# Patient Record
Sex: MALE | Race: WHITE | NOT HISPANIC OR LATINO | Employment: OTHER | URBAN - METROPOLITAN AREA
[De-identification: names, ages, dates, MRNs, and addresses within clinical notes are randomized per-mention and may not be internally consistent; named-entity substitution may affect disease eponyms.]

---

## 2017-06-12 DIAGNOSIS — N20.0 CALCULUS OF KIDNEY: ICD-10-CM

## 2017-06-14 ENCOUNTER — HOSPITAL ENCOUNTER (OUTPATIENT)
Dept: ULTRASOUND IMAGING | Facility: MEDICAL CENTER | Age: 72
Discharge: HOME | End: 2017-06-14
Payer: COMMERCIAL

## 2017-06-14 ENCOUNTER — TRANSCRIBE ORDERS (OUTPATIENT)
Dept: ADMINISTRATIVE | Facility: HOSPITAL | Age: 72
End: 2017-06-14

## 2017-06-14 ENCOUNTER — APPOINTMENT (OUTPATIENT)
Dept: RADIOLOGY | Facility: MEDICAL CENTER | Age: 72
End: 2017-06-14
Payer: COMMERCIAL

## 2017-06-14 DIAGNOSIS — N20.0 URIC ACID NEPHROLITHIASIS: Primary | ICD-10-CM

## 2017-06-14 DIAGNOSIS — N20.0 CALCULUS OF KIDNEY: ICD-10-CM

## 2017-06-14 PROCEDURE — 76770 US EXAM ABDO BACK WALL COMP: CPT

## 2017-06-14 PROCEDURE — 74000 HB X-RAY EXAM OF ABDOMEN (SINGLE ANTEROPOSTERIOR VIEW): CPT

## 2017-06-19 ENCOUNTER — ALLSCRIPTS OFFICE VISIT (OUTPATIENT)
Dept: OTHER | Facility: OTHER | Age: 72
End: 2017-06-19

## 2017-06-19 LAB
CLARITY UR: NORMAL
COLOR UR: YELLOW
GLUCOSE (HISTORICAL): NORMAL
HGB UR QL STRIP.AUTO: NORMAL
KETONES UR STRIP-MCNC: NORMAL MG/DL
LEUKOCYTE ESTERASE UR QL STRIP: NORMAL
NITRITE UR QL STRIP: NORMAL
PH UR STRIP.AUTO: 5 [PH]
PROT UR STRIP-MCNC: NORMAL MG/DL
SP GR UR STRIP.AUTO: 1.03

## 2018-01-11 NOTE — RESULT NOTES
Verified Results  (1) PT WITH INR [id number] 07:38AM Cale Bhakta     Test Name Result Flag Reference   INR 1 03  0 86-1 16   PT 13 5 seconds  12 0-14 3

## 2018-01-11 NOTE — RESULT NOTES
Verified Results  (1) CBC/PLT/DIFF [id number] 07:38AM Krause Yakov     Test Name Result Flag Reference   WBC COUNT 6 28 Thousand/uL  4 31-10 16   RBC COUNT 4 55 Million/uL  3 88-5 62   HEMOGLOBIN 15 3 g/dL  12 0-17 0   HEMATOCRIT 43 9 %  36 5-49 3   MCV 97 fL  82-98   MCH 33 6 pg  26 8-34 3   MCHC 34 9 g/dL  31 4-37 4   RDW 13 8 %  11 6-15 1   MPV 10 9 fL  8 9-12 7   PLATELET COUNT 92 Thousands/uL L 149-390   nRBC AUTOMATED 0 /100 WBCs     NEUTROPHILS RELATIVE PERCENT 61 %  43-75   LYMPHOCYTES RELATIVE PERCENT 25 %  14-44   MONOCYTES RELATIVE PERCENT 8 %  4-12   EOSINOPHILS RELATIVE PERCENT 5 %  0-6   BASOPHILS RELATIVE PERCENT 1 %  0-1   NEUTROPHILS ABSOLUTE COUNT 3 84 Thousands/?L  1 85-7 62   LYMPHOCYTES ABSOLUTE COUNT 1 59 Thousands/?L  0 60-4 47   MONOCYTES ABSOLUTE COUNT 0 52 Thousand/?L  0 17-1 22   EOSINOPHILS ABSOLUTE COUNT 0 30 Thousand/?L  0 00-0 61   BASOPHILS ABSOLUTE COUNT 0 03 Thousands/?L  0 00-0 10     (1) APTT [id number] 07:38AM Krause Yakov     Test Name Result Flag Reference   PARTIAL THROMBOPLASTIN TIME 32 seconds  24-36   Therapeutic Heparin Range = 60-90 seconds     (1) BASIC METABOLIC PROFILE [id number] 07:38AM Krause Yakov     Test Name Result Flag Reference   GLUCOSE,RANDM 111 mg/dL     If the patient is fasting, the ADA then defines impaired fasting glucose as > 100 mg/dL and diabetes as > or equal to 123 mg/dL  SODIUM 143 mmol/L  136-145   POTASSIUM 3 8 mmol/L  3 5-5 3   CHLORIDE 105 mmol/L  100-108   CARBON DIOXIDE 28 mmol/L  21-32   ANION GAP (CALC) 10 mmol/L  4-13   BLOOD UREA NITROGEN 12 mg/dL  5-25   CREATININE 0 82 mg/dL  0 60-1 30   Standardized to IDMS reference method   CALCIUM 9 0 mg/dL  8 3-10 1   eGFR Non-African American      >60 0 ml/min/1 73sq [facility] Disease Education Program recommendations are as follows:  GFR calculation is accurate only with a steady state creatinine  Chronic Kidney disease less than 60 ml/min/1 73 sq  meters  Kidney failure less than 15 ml/min/1 73 sq  meters       (1) URINE CULTURE [id number] 07:38AM EQUISO     Test Name Result Flag Reference   CLINICAL REPORT (Report)     Test:        Urine culture  Specimen Type:   Urine  Specimen Date:   11/4/2016 7:38 AM  Result Date:    11/5/2016 2:24 PM  Result Status:   Final result  Resulting Lab:   [place] [address]            Tel: [phone]      CULTURE                                       ------------------                                   20,000-29,000 cfu/ml Mixed Contaminants X3     ECG 12-LEAD [id number] 07:30AM EQUISO     Test Name Result Flag Reference   ECG 12-LEAD      Normal sinus rhythm   Low voltage QRS   Borderline ECG   When compared with ECG of 28-SEP-2016 13:50,   No significant change was found   Confirmed by Kat Ackerman ([id number]) on 11/4/2016 4:51:15 PM

## 2018-01-12 VITALS
WEIGHT: 237 LBS | BODY MASS INDEX: 39.49 KG/M2 | HEIGHT: 65 IN | DIASTOLIC BLOOD PRESSURE: 76 MMHG | SYSTOLIC BLOOD PRESSURE: 134 MMHG | HEART RATE: 100 BPM | RESPIRATION RATE: 16 BRPM

## 2018-01-13 NOTE — RESULT NOTES
Message   Recorded as Task   Date: 08/05/2016 09:39 AM, Created By: Daksha Casarez   Task Name: Document Appointment   Assigned To: Daksha Casarez   Regarding Patient: Mague Resendiz, Status: Active   Comment:    Daksha Casarez - 05 Aug 2016 9:39 AM     TASK CREATED  Pt called to cx'l MBB proc on 08 08 2016; stated pain lvl has been @ 2 for a week and he feels really good          Signatures   Electronically signed by : Zahida Romo, ; Aug  5 2016  9:39AM EST                       (Author)

## 2018-01-15 NOTE — RESULT NOTES
Verified Results  (1) CALCULI, RENAL [id number] 04:01PM Yue Portillo     Test Name Result Flag Reference   COLOR Albert Luu     SIZE 4x4x3 mm     STONE WEIGHT 36 0 mg     COMPOSITION Comment     Percentage (Represents the % composition)   CA OXALATE,MONOHYDR  98 %     CALCIUM PHOSPHATE 02 %     NIDUS Comment     Nidus composed of Calcium oxalate monohydrate  PLEASE NOTE Comment     Calculi report with photograph will follow via computer, mail or   delivery  COMMENT-STONE3 Comment     Physician questions regarding Calculi Analysis contact [facility] at:  [phone]  PHOTO Comment     Photograph will follow under separate cover  Performed at:  [address]  [id number]  : Cr Romeo MD, Phone:  [phone]   RENAL STONE DISCLAIMER Comment     This test was developed and its performance characteristics  determined by [employer]  It has not been cleared or approved  by the Food and Drug Administration

## 2018-01-16 NOTE — RESULT NOTES
Verified Results  CT ABDOMEN PELVIS W WO CONTRAST [id number] 07:10AM Nallely Tobar Order Number: [id number]   Performing Comments: CT of the abdomen and pelvis without contrast, with contrast, with delayed images  No oral contrast required  Hematuria/urogram   - Patient Instructions: [facility] [employer] [place] 10/10/16 @@ 7:30AM   DO NOT EAT OR DRINK 3 HOURS PRIOR TO SCAN   *CLEAR LIQUIDS ONLY: WATER, APPLE JUICE, CHICKEN BROTH, CLEAR TEA, BLACK COFFEE(NO CREAMER, NO SUGAR)   PLEASE ARRIVE 15 MINUTES EARLY TO REGISTER  BRING DOCTOR SCRIPT, INSURANCE CARDS AND PHOTO ID   ALSO, PLEASE BRING A LIST OF MEDICATIONS AND ANY OVER-THE-COUNTER OR HERBAL SUPPLEMENTS YOU MAY BE CURRENTLY USING  IF YOU NEED TO RESCHEDULE YOUR APPOINTMENT, [address] [place] [address] SCHEDULING DEPT [phone] AND GIVE YOUR CONFIRMATION NUMBER  PLEASE REMEMBER TO CALL OUR OFFICE [phone] TO NOTIFY US OF ANY CHANGES TO YOUR APPOINTMENT  Test Name Result Flag Reference   CT ABDOMEN PELVIS W WO CONTRAST (Report)     CT ABDOMEN AND PELVIS WITH AND WITHOUT IV CONTRAST     INDICATION: Horseshoe kidney, right ureteral calculus status post right stent placement  Hematuria  COMPARISON: 9/28/2016     TECHNIQUE: CT of the kidneys was performed without intravenous contrast  Dynamic postcontrast CT evaluation of the abdomen and pelvis was performed in both nephrographic and delayed phases after the administration of intravenous contrast  Axial,    sagittal and coronal reformatted images were submitted for interpretation  This examination, like all CT scans performed in the [facility], was performed utilizing techniques to minimize radiation dose exposure, including the use of    iterative reconstruction and automated exposure control  100 ml of Omnipaque 350 was injected intravenously  Enteric contrast was not administered       FINDINGS:     ABDOMEN     KIDNEYS:   As seen on previous studies, a horseshoe kidney is identified  There is no calculus within the renal parenchyma  There has been interval placement of a right ureteral stent  Proximal portion curls within the right moiety pelvis, distally curls within    the urinary bladder  The previously seen perinephric inflammation and collecting system distention  Left moiety parapelvic cysts are seen and there are several subcentimeter hypodense nodules which are too small for accurate characterization  URINARY BLADDER:   No bladder wall mass  No bladder calculus is seen  Distal portion of ureteral stent curls within the anterior aspect of the urinary bladder  The right ureteral calculus is best visualized on the nonenhanced images  On series 2/92, a 3 mm calculus is seen adjacent to the stent at the level of the mid sacrum  There is no change in ureteral caliber at this level nor is there contrast    extravasation  LUNG BASES: Unremarkable  LIVER/BILIARY TREE: Heterogeneous hepatic density, with lobular margination compatible with known history of cirrhosis  There are multiple subcentimeter hypodense nodules too small for accurate characterization  As described on prior unenhanced study    there is a larger hypodense nodule along the inferior margin of the posterior right hepatic lobe measuring 18 mm in size  It remains within fluid density on delayed contrast-enhanced images favoring hepatic cyst      GALLBLADDER: Gallbladder is surgically absent  SPLEEN: Mild splenomegaly measuring almost 14 cm     PANCREAS: Unremarkable  ADRENAL GLANDS: Unremarkable  STOMACH, BOWEL AND APPENDIX: Unremarkable  ABDOMINOPELVIC CAVITY: No ascites  No free intraperitoneal air  No lymphadenopathy  VESSELS: No AAA  Collateral vessels in the upper abdomen     PELVIS     REPRODUCTIVE ORGANS: Unremarkable for patient's age  ABDOMINAL WALL/INGUINAL REGIONS: Unremarkable       OSSEOUS STRUCTURES: No acute fracture or destructive osseous lesion  IMPRESSION:       1  Status post placement of right ureteral stent with resolution of previously seen hydronephrosis of the right horseshoe kidney moiety  2  3 mm calculus adjacent to the stent at the level of the mid sacrum  3  Left moiety parapelvic cysts and several subcentimeter hypodense nodules too small to characterize  No suspicious lesion detected  4  Findings compatible with hepatic cirrhosis and portal hypertension   Previously described hypodense nodule associated with the undersurface of the right hepatic lobe demonstrates no abnormal enhancement favoring cyst  Periodic reassessment of the    liver recommended in this patient with cirrhosis/elevated risk for [address]        Workstation performed: [id number]     Signed by:   Juan Chávez MD   10/11/16

## 2018-01-17 NOTE — RESULT NOTES
Message   Recorded as Task   Date: 07/13/2016 10:12 AM, Created By: Milly Fuchs   Task Name: Follow Up   Assigned To: [id number] [address] end procedure,Team   Regarding Patient: Lobito Craig, Status: In Progress   CommentMary Boo - 13 Jul 2016 10:12 AM     TASK CREATED  Pt is S/P LT L3 TFESI #2 on 7/06/16 by Dr Mitchell  Pt has F/U appt on 7/21/16 with Bri Patton - 15 Jul 2016 1:01 PM     TASK EDITED  1st attempt to call, no answer  LMOM for CB     Dinora Adams - 18 Jul 2016 9:16 AM     TASK EDITED    S/w patient for followup after injection  -reports 30% relief   -still having problems going up steps  -otherwise, doing a lot better  -confirmed followup appt on 7/21/16   Bekah Nobles - 19 Jul 2016 12:52 PM     TASK REPLIED TO: Previously Assigned To Bekah Nobles                      aware agree with plan        Signatures   Electronically signed by : Anya Vo, ; Jul 21 2016  7:47AM EST                       (Author)

## 2018-01-18 NOTE — RESULT NOTES
Verified Results  (1) TISSUE EXAM [id number] 08:07AM Leandra Smith     Test Name Result Flag Reference   LAB AP CASE REPORT (Report)     Surgical Pathology Report             Case: [id number]                   Authorizing Provider: Alex Chambers MD  Collected:      11/09/2016 [id number]        Ordering Location:   [facility]    Received:      11/09/2016 [facility] Operating Room                            Pathologist:      Mandeep Garcia MD                                Specimen:  Ureter, Right, Right kidney stone   LAB AP FINAL DIAGNOSIS (Report)     A  Ureter, Right, Right kidney stone:   - Calculus, for gross diagnosis only  Note: The stone is sent to [address] [place] [address] for analysis; a separate report will   be issued by [address] [place] [address] following completion of their studies  Interpretation performed at [place] [address]  Electronically signed by Mandeep Garcia MD on 11/10/2016 at 4:45 PM   LAB AP SURGICAL ADDITIONAL INFORMATION (Report)     These tests were developed and their performance characteristics   determined by [facility] or [facility]  They may not be cleared or approved by the [place]  Food and   Drug Administration  The FDA has determined that such clearance or   approval is not necessary  These tests are used for clinical purposes  They should not be regarded as investigational or for research  This   laboratory has been approved by [place] [address], designated as a high-complexity   laboratory and is qualified to perform these tests  LAB AP GROSS DESCRIPTION (Report)     A  The specimen is received fresh, labeled with the patient's name and   hospital number, and is designated right kidney stone, is a dark brown   and hard calculus measuring 0 5 cm in greatest dimension  The specimen is   for gross examination only and no sections are submitted  The specimen is   sent for chemical analysis      [facility] [address], [address] [place], [place] [address]R

## 2018-06-19 ENCOUNTER — HOSPITAL ENCOUNTER (OUTPATIENT)
Dept: ULTRASOUND IMAGING | Facility: MEDICAL CENTER | Age: 73
Discharge: HOME | End: 2018-06-19
Payer: COMMERCIAL

## 2018-06-19 DIAGNOSIS — Q63.1 LOBULATED, FUSED AND HORSESHOE KIDNEY: ICD-10-CM

## 2018-06-19 PROCEDURE — 76770 US EXAM ABDO BACK WALL COMP: CPT

## 2018-07-23 ENCOUNTER — APPOINTMENT (OUTPATIENT)
Dept: RADIOLOGY | Facility: MEDICAL CENTER | Age: 73
End: 2018-07-23
Payer: COMMERCIAL

## 2018-07-23 DIAGNOSIS — Q63.1 LOBULATED, FUSED AND HORSESHOE KIDNEY: ICD-10-CM

## 2018-07-23 PROCEDURE — 74018 RADEX ABDOMEN 1 VIEW: CPT

## 2018-07-27 ENCOUNTER — OFFICE VISIT (OUTPATIENT)
Dept: UROLOGY | Facility: CLINIC | Age: 73
End: 2018-07-27
Payer: COMMERCIAL

## 2018-07-27 VITALS
SYSTOLIC BLOOD PRESSURE: 120 MMHG | DIASTOLIC BLOOD PRESSURE: 78 MMHG | BODY MASS INDEX: 39.61 KG/M2 | WEIGHT: 238 LBS | HEART RATE: 84 BPM

## 2018-07-27 DIAGNOSIS — Q63.1 HORSESHOE KIDNEY: ICD-10-CM

## 2018-07-27 DIAGNOSIS — N20.0 RIGHT KIDNEY STONE: Primary | ICD-10-CM

## 2018-07-27 PROCEDURE — 99213 OFFICE O/P EST LOW 20 MIN: CPT | Performed by: UROLOGY

## 2018-07-27 NOTE — PROGRESS NOTES
UROLOGY ROUTINE FOLLOW UP NOTE     CHIEF COMPLAINT   Stevie Rivera Sr  is a 72 y o  male with a complaint of   Chief Complaint   Patient presents with    Nephrolithiasis       History of Present Illness:     72 y o  male with a history of horseshoe kidney and stone disease  Patient underwent ureteroscopy in September and November of 2016  He has been stone free since  He is having no complaints of kidney pain or hematuria  He has not had any recurrent stone issues      Past Medical History:     Past Medical History:   Diagnosis Date    Acute right flank pain     Arthritis     generalized    Asthma     uses inhaler    Back pain     Cellulitis     [facility]idiopathic thrombocytopenia) ([facility])     Chronic kidney disease     stones    COPD (chronic obstructive pulmonary disease) (HCC)     Full dentures     GERD (gastroesophageal reflux disease)     Gout     Hearing difficulty of both ears     Hypertension     ITP (idiopathic thrombocytopenic purpura)     Lumbar disc herniation     Renal mass     Spinal stenosis of lumbar region     Tobacco abuse     Wears glasses        PAST SURGICAL HISTORY:     Past Surgical History:   Procedure Laterality Date    CARPAL TUNNEL RELEASE Bilateral     CHOLECYSTECTOMY      FINGER SURGERY Right     index finger    LUMBAR LAMINECTOMY      spinal stenosis     GA CYSTO/URETERO W/LITHOTRIPSY &INDWELL STENT INSRT Right 11/9/2016    Procedure: CYSTOSCOPY URETEROSCOPY WITH LITHOTRIPSY, RETROGRADE PYELOGRAM; BASKET STONE EXTRACTION ;  Surgeon: Karlie Moon MD;  Location: [facility];  Service: Urology    GA CYSTO/URETERO W/LITHOTRIPSY &INDWELL STENT INSRT Right 9/28/2016    Procedure: CYSTOSCOPY, URETEROSCOPY, DILATION  OF URETERAL STRICTURE, RETROGRADE PYELOGRAM, AND INSERTION STENT URETERAL;  Surgeon: Karlie Moon MD;  Location: [facility];  Service: Urology       CURRENT MEDICATIONS:     Current Outpatient Prescriptions   Medication Sig Dispense Refill    acetaminophen (TYLENOL) 325 mg tablet Take 650 mg by mouth every 6 (six) hours as needed for mild pain      amLODIPine (NORVASC) 5 mg tablet Take 5 mg by mouth daily      budesonide-formoterol (SYMBICORT) 160-4 5 mcg/act inhaler Inhale 2 puffs as needed        celecoxib (CeleBREX) 100 mg capsule Take 100 mg by mouth daily        cyanocobalamin (VITAMIN B-12) 500 mcg tablet Take 500 mcg by mouth daily   Fish Oil OIL Take 1,000 g by mouth daily        omeprazole (PRILOSEC) 20 mg delayed release capsule Take 20 mg by mouth daily        oxybutynin (DITROPAN-XL) 5 mg 24 hr tablet Take 1 tablet by mouth daily for 30 days (Patient taking differently: Take 5 mg by mouth daily Does not take any longer  LD  10/29/16 ) 30 tablet 0    tamsulosin (FLOMAX) 0 4 mg Take 1 capsule by mouth daily with dinner for 30 days 30 capsule 0     No current facility-administered medications for this visit  ALLERGIES:     Allergies   Allergen Reactions    Codeine GI Intolerance     Nausea and bleeding       SOCIAL HISTORY:     Social History     Social History    Marital status: /Civil Union     Spouse name: N/A    Number of children: N/A    Years of education: N/A     Occupational History    park host      Social History Main Topics    Smoking status: Light Tobacco Smoker     Types: Pipe    Smokeless tobacco: Never Used      Comment: pipe once a day    Alcohol use No    Drug use: No    Sexual activity: Not Asked     Other Topics Concern    None     Social History Narrative    None       SOCIAL HISTORY:   History reviewed  No pertinent family history  REVIEW OF SYSTEMS:     Review of Systems   Constitutional: Negative  Respiratory: Negative  Cardiovascular: Negative  Gastrointestinal: Negative  Genitourinary: Negative  Musculoskeletal: Negative  Skin: Negative  Neurological: Negative  Psychiatric/Behavioral: Negative      All other systems reviewed and are negative  PHYSICAL EXAM:     /78   Pulse 84   Wt 108 kg (238 lb)   BMI 39 61 kg/m²     General:  Healthy appearing male in no acute distress  They have a normal affect  There is not appear to be any gross neurologic defects or abnormalities  HEENT:  Normocephalic, atraumatic  Neck is supple without any palpable lymphadenopathy  Cardiovascular:  Patient has normal palpable distal radial pulses  There is no significant peripheral edema  No JVD is noted  Respiratory:  Patient has unlabored respirations  There is no audible wheeze or rhonchi  Abdomen:  Abdomen is obese  Abdomen is soft and nontender  There is no tympany  Inguinal and umbilical hernia are not appreciated  Genitourinary: no penile lesions or discharge, no testicular masses or tenderness, no hernias    Musculoskeletal:  Patient does not have significant CVA tenderness in the  flank with palpation or percussion  They full range of motion in all 4 extremities  Strength in all 4 extremities appears congruent  Patient is able to ambulate without assistance or difficulty  Dermatologic:  Patient has no skin abnormalities or rashes  LABS:     CBC:   Lab Results   Component Value Date    WBC 6 28 2016    HGB 15 3 2016    HCT 43 9 2016    MCV 97 2016    PLT 92 (L) 2016       BMP:   Lab Results   Component Value Date    GLUCOSE 111 2016    CALCIUM 9 0 2016     2016    K 3 8 2016    CO2 28 2016     2016    BUN 12 2016    CREATININE 0 82 2016       IMAGIN/23/18  ABDOMEN     INDICATION:   Q63 1: Lobulated, fused and horseshoe kidney      COMPARISON:  2017     VIEWS:  AP supine  Images: 3     FINDINGS:     No radiopaque renal calculi seen      No radiopaque ureteral calculi identified      Nonobstructive bowel gas pattern      No acute osseous abnormality is seen  Degenerative changes in the lumbar spine    Surgical clips in the right upper quadrant      IMPRESSION:     No radiopaque urinary tract calculi  6/16/18  RENAL ULTRASOUND     INDICATION:   Q63 1: Lobulated, fused and horseshoe kidney  History of horseshoe kidney  Annual follow-up      COMPARISON: Renal ultrasound June 14, 2017      TECHNIQUE:   Ultrasound of the retroperitoneum was performed with a curvilinear transducer utilizing volumetric sweeps and still imaging techniques       FINDINGS:  Study suboptimal due to patient body habitus      KIDNEYS:  Stable renal asymmetry  Abnormal configuration of the kidney, representing either crossed fused renal ectopia versus atypical horseshoe configuration  Right kidney:  9 6 x 4 4 cm  Left kidney:  14 0 x 5 1 cm      Right kidney  Normal echogenicity and contour  No suspicious masses detected  1 7 x 1 3 cm simple cortical cyst midpole, stable  No hydronephrosis  No shadowing calculi  No perinephric fluid collections      Left kidney  Normal echogenicity and contour  No suspicious masses detected  No hydronephrosis  No shadowing calculi  No perinephric fluid collections      URETERS:  Nonvisualized      BLADDER:   Incompletely distended  No focal thickening or mass lesions  Bilateral ureteral jets detected         IMPRESSION:  Stable sonographic appearance of the kidneys  No sonographic evidence for renal calculi  PATHOLOGY:     11/10/16  4:01 PM     COLOR  Violet Armendariz    Size mm 4x4x3    Stone Weight mg 36 0    Composition  Comment    Comment: Percentage (Represents the % composition)   Ca Oxalate,Monohydr  % 98    CALCIUM PHOSPHATE % 02    Nidus  Comment    Comment: Nidus composed of Calcium oxalate monohydrate  STONE COMMENT  Comment      ASSESSMENT:     72 y o  male with horseshoe kidney and prior stone disease    PLAN:     Patient's x-ray and ultrasound showed resolution of his stone disease  I have recommended yearly follow-up given the anatomic abnormality of his horseshoe kidney    We again discussed the stone precautions and dietary changes that can reduce stone formation in the future  Patient does still smoke a pipe from time to time and I discussed with him the risk of renal and bladder malignancy related to cigarette smoke  Should the patient ever be interested in quitting, I would be happy to discuss with him or assist in any way possible      One year follow-up with x-ray and ultrasound

## 2021-05-20 ENCOUNTER — APPOINTMENT (EMERGENCY)
Dept: RADIOLOGY | Facility: HOSPITAL | Age: 76
End: 2021-05-20
Payer: COMMERCIAL

## 2021-05-20 ENCOUNTER — HOSPITAL ENCOUNTER (EMERGENCY)
Facility: HOSPITAL | Age: 76
Discharge: HOME | End: 2021-05-20
Attending: EMERGENCY MEDICINE
Payer: COMMERCIAL

## 2021-05-20 VITALS
OXYGEN SATURATION: 93 % | WEIGHT: 245 LBS | RESPIRATION RATE: 38 BRPM | BODY MASS INDEX: 40.77 KG/M2 | TEMPERATURE: 98.5 F | DIASTOLIC BLOOD PRESSURE: 86 MMHG | HEART RATE: 109 BPM | SYSTOLIC BLOOD PRESSURE: 180 MMHG

## 2021-05-20 DIAGNOSIS — R79.89 ELEVATED SERUM CREATININE: ICD-10-CM

## 2021-05-20 DIAGNOSIS — J20.9 ACUTE BRONCHITIS: Primary | ICD-10-CM

## 2021-05-20 LAB
ALBUMIN SERPL BCP-MCNC: 3.4 G/DL (ref 3.5–5)
ALP SERPL-CCNC: 66 U/L (ref 46–116)
ALT SERPL W P-5'-P-CCNC: 49 U/L (ref 12–78)
ANION GAP SERPL CALCULATED.3IONS-SCNC: 10 MMOL/L (ref 4–13)
APTT PPP: 41 SECONDS (ref 23–37)
AST SERPL W P-5'-P-CCNC: 56 U/L (ref 5–45)
BACTERIA UR QL AUTO: NORMAL /HPF
BASOPHILS # BLD AUTO: 0.05 THOUSANDS/ΜL (ref 0–0.1)
BASOPHILS NFR BLD AUTO: 1 % (ref 0–1)
BILIRUB SERPL-MCNC: 1 MG/DL (ref 0.2–1)
BILIRUB UR QL STRIP: NEGATIVE
BUN SERPL-MCNC: 18 MG/DL (ref 5–25)
CALCIUM ALBUM COR SERPL-MCNC: 8.7 MG/DL (ref 8.3–10.1)
CALCIUM SERPL-MCNC: 8.2 MG/DL (ref 8.3–10.1)
CHLORIDE SERPL-SCNC: 103 MMOL/L (ref 100–108)
CLARITY UR: CLEAR
CO2 SERPL-SCNC: 27 MMOL/L (ref 21–32)
COLOR UR: YELLOW
CREAT SERPL-MCNC: 1.34 MG/DL (ref 0.6–1.3)
EOSINOPHIL # BLD AUTO: 0.08 THOUSAND/ΜL (ref 0–0.61)
EOSINOPHIL NFR BLD AUTO: 1 % (ref 0–6)
ERYTHROCYTE [DISTWIDTH] IN BLOOD BY AUTOMATED COUNT: 13.9 % (ref 11.6–15.1)
GFR SERPL CREATININE-BSD FRML MDRD: 52 ML/MIN/1.73SQ M
GLUCOSE SERPL-MCNC: 109 MG/DL (ref 65–140)
GLUCOSE UR STRIP-MCNC: NEGATIVE MG/DL
HCT VFR BLD AUTO: 42.7 % (ref 36.5–49.3)
HGB BLD-MCNC: 14.8 G/DL (ref 12–17)
HGB UR QL STRIP.AUTO: ABNORMAL
IMM GRANULOCYTES # BLD AUTO: 0.06 THOUSAND/UL (ref 0–0.2)
IMM GRANULOCYTES NFR BLD AUTO: 1 % (ref 0–2)
INR PPP: 1.15 (ref 0.84–1.19)
KETONES UR STRIP-MCNC: NEGATIVE MG/DL
LACTATE SERPL-SCNC: 1.6 MMOL/L (ref 0.5–2)
LEUKOCYTE ESTERASE UR QL STRIP: NEGATIVE
LYMPHOCYTES # BLD AUTO: 1.15 THOUSANDS/ΜL (ref 0.6–4.47)
LYMPHOCYTES NFR BLD AUTO: 14 % (ref 14–44)
MCH RBC QN AUTO: 33.6 PG (ref 26.8–34.3)
MCHC RBC AUTO-ENTMCNC: 34.7 G/DL (ref 31.4–37.4)
MCV RBC AUTO: 97 FL (ref 82–98)
MONOCYTES # BLD AUTO: 1.49 THOUSAND/ΜL (ref 0.17–1.22)
MONOCYTES NFR BLD AUTO: 18 % (ref 4–12)
NEUTROPHILS # BLD AUTO: 5.51 THOUSANDS/ΜL (ref 1.85–7.62)
NEUTS SEG NFR BLD AUTO: 65 % (ref 43–75)
NITRITE UR QL STRIP: NEGATIVE
NON-SQ EPI CELLS URNS QL MICRO: NORMAL /HPF
NRBC BLD AUTO-RTO: 0 /100 WBCS
NT-PROBNP SERPL-MCNC: 100 PG/ML
PH UR STRIP.AUTO: 5.5 [PH]
PLATELET # BLD AUTO: 118 THOUSANDS/UL (ref 149–390)
PMV BLD AUTO: 10.2 FL (ref 8.9–12.7)
POTASSIUM SERPL-SCNC: 4.1 MMOL/L (ref 3.5–5.3)
PROCALCITONIN SERPL-MCNC: 0.14 NG/ML
PROT SERPL-MCNC: 8.1 G/DL (ref 6.4–8.2)
PROT UR STRIP-MCNC: ABNORMAL MG/DL
PROTHROMBIN TIME: 14.7 SECONDS (ref 11.6–14.5)
RBC # BLD AUTO: 4.41 MILLION/UL (ref 3.88–5.62)
RBC #/AREA URNS AUTO: NORMAL /HPF
SARS-COV-2 RNA RESP QL NAA+PROBE: NEGATIVE
SODIUM SERPL-SCNC: 140 MMOL/L (ref 136–145)
SP GR UR STRIP.AUTO: 1.01 (ref 1–1.03)
TROPONIN I SERPL-MCNC: <0.02 NG/ML
UROBILINOGEN UR QL STRIP.AUTO: 0.2 E.U./DL
WBC # BLD AUTO: 8.34 THOUSAND/UL (ref 4.31–10.16)
WBC #/AREA URNS AUTO: NORMAL /HPF

## 2021-05-20 PROCEDURE — 81001 URINALYSIS AUTO W/SCOPE: CPT | Performed by: EMERGENCY MEDICINE

## 2021-05-20 PROCEDURE — 93005 ELECTROCARDIOGRAM TRACING: CPT

## 2021-05-20 PROCEDURE — 36415 COLL VENOUS BLD VENIPUNCTURE: CPT | Performed by: EMERGENCY MEDICINE

## 2021-05-20 PROCEDURE — 85025 COMPLETE CBC W/AUTO DIFF WBC: CPT | Performed by: EMERGENCY MEDICINE

## 2021-05-20 PROCEDURE — 99284 EMERGENCY DEPT VISIT MOD MDM: CPT | Performed by: EMERGENCY MEDICINE

## 2021-05-20 PROCEDURE — 85730 THROMBOPLASTIN TIME PARTIAL: CPT | Performed by: EMERGENCY MEDICINE

## 2021-05-20 PROCEDURE — 71045 X-RAY EXAM CHEST 1 VIEW: CPT

## 2021-05-20 PROCEDURE — U0005 INFEC AGEN DETEC AMPLI PROBE: HCPCS | Performed by: EMERGENCY MEDICINE

## 2021-05-20 PROCEDURE — 99284 EMERGENCY DEPT VISIT MOD MDM: CPT

## 2021-05-20 PROCEDURE — 83605 ASSAY OF LACTIC ACID: CPT | Performed by: EMERGENCY MEDICINE

## 2021-05-20 PROCEDURE — 83880 ASSAY OF NATRIURETIC PEPTIDE: CPT | Performed by: EMERGENCY MEDICINE

## 2021-05-20 PROCEDURE — 80053 COMPREHEN METABOLIC PANEL: CPT | Performed by: EMERGENCY MEDICINE

## 2021-05-20 PROCEDURE — U0003 INFECTIOUS AGENT DETECTION BY NUCLEIC ACID (DNA OR RNA); SEVERE ACUTE RESPIRATORY SYNDROME CORONAVIRUS 2 (SARS-COV-2) (CORONAVIRUS DISEASE [COVID-19]), AMPLIFIED PROBE TECHNIQUE, MAKING USE OF HIGH THROUGHPUT TECHNOLOGIES AS DESCRIBED BY CMS-2020-01-R: HCPCS | Performed by: EMERGENCY MEDICINE

## 2021-05-20 PROCEDURE — 85610 PROTHROMBIN TIME: CPT | Performed by: EMERGENCY MEDICINE

## 2021-05-20 PROCEDURE — 84484 ASSAY OF TROPONIN QUANT: CPT | Performed by: EMERGENCY MEDICINE

## 2021-05-20 PROCEDURE — 84145 PROCALCITONIN (PCT): CPT | Performed by: EMERGENCY MEDICINE

## 2021-05-20 PROCEDURE — 87040 BLOOD CULTURE FOR BACTERIA: CPT | Performed by: EMERGENCY MEDICINE

## 2021-05-20 RX ORDER — AMOXICILLIN 500 MG/1
500 CAPSULE ORAL 3 TIMES DAILY
Qty: 30 CAPSULE | Refills: 0 | Status: SHIPPED | OUTPATIENT
Start: 2021-05-20 | End: 2021-05-30

## 2021-05-20 RX ORDER — SODIUM CHLORIDE 9 MG/ML
3 INJECTION INTRAVENOUS
Status: DISCONTINUED | OUTPATIENT
Start: 2021-05-20 | End: 2021-05-20 | Stop reason: HOSPADM

## 2021-05-20 RX ORDER — AMOXICILLIN 250 MG/1
500 CAPSULE ORAL ONCE
Status: COMPLETED | OUTPATIENT
Start: 2021-05-20 | End: 2021-05-20

## 2021-05-20 RX ORDER — COLCHICINE 0.6 MG/1
CAPSULE ORAL EVERY 24 HOURS
COMMUNITY

## 2021-05-20 RX ADMIN — AMOXICILLIN 500 MG: 250 CAPSULE ORAL at 19:10

## 2021-05-20 NOTE — ED PROVIDER NOTES
History  Chief Complaint   Patient presents with    Fever - 9 weeks to 74 years     To ED with c/o fever, chills and cough since Monday  Notes decreased urinary output  Denies any pain     Patient with PMH horseshoe kidney, asthma, ITP, CKD, HTN, COPD referred over from PCP with temp 100 1, pain in left kidney and decreased breath sounds left kidney  Patient has had sob fever chills dry cough for about 4 days now  He has been taking Tylenol PM without relief  He already received second covid-19 vaccine about 3 weeks ago  Prior to Admission Medications   Prescriptions Last Dose Informant Patient Reported? Taking? Colchicine 0 6 MG CAPS   Yes No   Sig: every 24 hours   Fish Oil OIL   Yes No   Sig: Take 1,000 g by mouth daily     acetaminophen (TYLENOL) 325 mg tablet   Yes No   Sig: Take 650 mg by mouth every 6 (six) hours as needed for mild pain   amLODIPine (NORVASC) 5 mg tablet   Yes No   Sig: Take 5 mg by mouth daily   budesonide-formoterol (SYMBICORT) 160-4 5 mcg/act inhaler   Yes No   Sig: Inhale 2 puffs as needed     celecoxib (CeleBREX) 100 mg capsule   Yes No   Sig: Take 100 mg by mouth daily     cyanocobalamin (VITAMIN B-12) 500 mcg tablet   Yes No   Sig: Take 500 mcg by mouth daily  omeprazole (PRILOSEC) 20 mg delayed release capsule   Yes No   Sig: Take 20 mg by mouth daily     oxybutynin (DITROPAN-XL) 5 mg 24 hr tablet  Self No No   Sig: Take 1 tablet by mouth daily for 30 days   Patient taking differently: Take 5 mg by mouth daily Does not take any longer    LD  10/29/16    tamsulosin (FLOMAX) 0 4 mg   No No   Sig: Take 1 capsule by mouth daily with dinner for 30 days      Facility-Administered Medications: None       Past Medical History:   Diagnosis Date    Acute right flank pain     Arthritis     generalized    Asthma     uses inhaler    Back pain     Cellulitis     Chronic ITP (idiopathic thrombocytopenia) (HCC)     Chronic kidney disease     stones    COPD [facility]chronic obstructive pulmonary disease) ([facility] [address] )     Full dentures     GERD (gastroesophageal reflux disease)     Gout     Hearing difficulty of both ears     Hypertension     ITP (idiopathic thrombocytopenic purpura)     Lumbar disc herniation     Renal mass     Spinal stenosis of lumbar region     Tobacco abuse     Wears glasses        Past Surgical History:   Procedure Laterality Date    CARPAL TUNNEL RELEASE Bilateral     CHOLECYSTECTOMY      FINGER SURGERY Right     index finger    LUMBAR LAMINECTOMY      spinal stenosis     RI CYSTO/URETERO W/LITHOTRIPSY &INDWELL STENT INSRT Right 11/9/2016    Procedure: CYSTOSCOPY URETEROSCOPY WITH LITHOTRIPSY, RETROGRADE PYELOGRAM; BASKET STONE EXTRACTION ;  Surgeon: Keyona Vega MD;  Location: [facility];  Service: Urology    RI CYSTO/URETERO W/LITHOTRIPSY &INDWELL STENT INSRT Right 9/28/2016    Procedure: CYSTOSCOPY, URETEROSCOPY, DILATION  OF URETERAL STRICTURE, RETROGRADE PYELOGRAM, AND INSERTION STENT URETERAL;  Surgeon: Keyona Vega MD;  Location: [facility];  Service: Urology       History reviewed  No pertinent family history  I have reviewed and agree with the history as documented  E-Cigarette/Vaping    E-Cigarette Use Never User      E-Cigarette/Vaping Substances    Nicotine No     Flavoring No      Social History     Tobacco Use    Smoking status: Light Tobacco Smoker     Types: Pipe    Smokeless tobacco: Never Used    Tobacco comment: pipe once a day   Substance Use Topics    Alcohol use: No    Drug use: No       Review of Systems   Constitutional: Positive for chills and fever  HENT: Negative for rhinorrhea and sore throat  Respiratory: Positive for cough and shortness of breath  Cardiovascular: Negative for chest pain  Gastrointestinal: Negative for constipation, diarrhea, nausea and vomiting  Genitourinary: Negative for dysuria and frequency  Musculoskeletal: Positive for back pain  Skin: Negative for rash     All other systems reviewed and are negative  Physical Exam  Physical Exam  Vitals signs and nursing note reviewed  Constitutional:       Appearance: He is well-developed  HENT:      Head: Normocephalic and atraumatic  Right Ear: External ear normal       Left Ear: External ear normal       Nose: Nose normal    Eyes:      Conjunctiva/sclera: Conjunctivae normal       Pupils: Pupils are equal, round, and reactive to light  Neck:      Musculoskeletal: Normal range of motion and neck supple  No spinous process tenderness  Cardiovascular:      Rate and Rhythm: Normal rate and regular rhythm  Heart sounds: Normal heart sounds  Pulmonary:      Effort: Pulmonary effort is normal  Tachypnea present  No respiratory distress  Breath sounds: Normal breath sounds  No wheezing  Abdominal:      General: Bowel sounds are normal  There is no distension  Palpations: Abdomen is soft  Tenderness: There is no abdominal tenderness  There is no right CVA tenderness or left CVA tenderness  Musculoskeletal: Normal range of motion  General: No deformity  Skin:     General: Skin is warm and dry  Findings: No rash  Neurological:      General: No focal deficit present  Mental Status: He is alert and oriented to person, place, and time  GCS: GCS eye subscore is 4  GCS verbal subscore is 5  GCS motor subscore is 6  Sensory: No sensory deficit     Psychiatric:         Mood and Affect: Mood normal          Vital Signs  ED Triage Vitals   Temperature Pulse Respirations Blood Pressure SpO2   05/20/21 1718 05/20/21 1715 05/20/21 1715 05/20/21 1715 05/20/21 1715   98 5 °F (36 9 °C) 105 (!) 35 129/71 94 %      Temp Source Heart Rate Source Patient Position - Orthostatic VS BP Location FiO2 (%)   05/20/21 1718 05/20/21 1718 05/20/21 1718 05/20/21 1718 --   Tympanic Monitor Sitting Right arm       Pain Score       --                  Vitals:    05/20/21 1815 05/20/21 1830 05/20/21 1845 05/20/21 1900   BP: 145/79 159/87  (!) 180/86   Pulse: (!) 107 (!) 109 (!) 107 (!) 109   Patient Position - Orthostatic VS:             Visual Acuity      ED Medications  Medications   amoxicillin (AMOXIL) capsule 500 mg (500 mg Oral Given 5/20/21 1910)       Diagnostic Studies  Results Reviewed     Procedure Component Value Units Date/Time    Novel Coronavirus [facility] [[id number]]  (Normal) Collected: 05/20/21 1905    Lab Status: Final result Specimen: Nares from Nose Updated: 05/20/21 2028     SARS-CoV-2 Negative    Narrative: The specimen collection materials, transport medium, and/or testing methodology utilized in the production of these test results have been proven to be reliable in a limited validation with an abbreviated program under the Emergency Utilization Authorization provided by the FDA  Testing reported as "Presumptive positive" will be confirmed with secondary testing to ensure result accuracy  Clinical caution and judgement should be used with the interpretation of these results with consideration of the clinical impression and other laboratory testing  Testing reported as "Positive" or "Negative" has been proven to be accurate according to standard laboratory validation requirements  All testing is performed with control materials showing appropriate reactivity at standard intervals        Urine Microscopic [[id number]]  (Normal) Collected: 05/20/21 1816    Lab Status: Final result Specimen: Urine, Clean Catch Updated: 05/20/21 1942     RBC, UA 1-2 /hpf      WBC, UA None Seen /hpf      Epithelial Cells None Seen /hpf      Bacteria, UA None Seen /hpf     UA w Reflex to Microscopic w Reflex to Culture [[id number]]  (Abnormal) Collected: 05/20/21 1816    Lab Status: Final result Specimen: Urine, Clean Catch Updated: 05/20/21 1826     Color, UA Yellow     Clarity, UA Clear     Specific Gravity, UA 1 015     pH, UA 5 5     Leukocytes, UA Negative     Nitrite, UA Negative     Protein, UA 30 (1+) mg/dl      Glucose, UA Negative mg/dl      Ketones, UA Negative mg/dl      Urobilinogen, UA 0 2 E U /dl      Bilirubin, UA Negative     Blood, UA Small    Protime-INR [[id number]]  (Abnormal) Collected: 05/20/21 1730    Lab Status: Final result Specimen: Blood from Arm, Right Updated: 05/20/21 1817     Protime 14 7 seconds      INR 1 15    Narrative:      No clots    APTT [[id number]]  (Abnormal) Collected: 05/20/21 1730    Lab Status: Final result Specimen: Blood from Arm, Right Updated: 05/20/21 1817     PTT 41 seconds     NT-BNP PRO [[id number]]  (Normal) Collected: 05/20/21 1730    Lab Status: Final result Specimen: Blood from Arm, Right Updated: 05/20/21 1810     NT-proBNP 100 pg/mL     Lactic Acid [[id number]]  (Normal) Collected: 05/20/21 1730    Lab Status: Final result Specimen: Blood from Arm, Right Updated: 05/20/21 1805     LACTIC ACID 1 6 mmol/L     Narrative:      Result may be elevated if tourniquet was used during collection      Troponin I [[id number]]  (Normal) Collected: 05/20/21 1730    Lab Status: Final result Specimen: Blood from Arm, Right Updated: 05/20/21 1805     Troponin I <0 02 ng/mL     Comprehensive metabolic panel [[id number]]  (Abnormal) Collected: 05/20/21 1730    Lab Status: Final result Specimen: Blood from Arm, Right Updated: 05/20/21 1803     Sodium 140 mmol/L      Potassium 4 1 mmol/L      Chloride 103 mmol/L      CO2 27 mmol/L      ANION GAP 10 mmol/L      BUN 18 mg/dL      Creatinine 1 34 mg/dL      Glucose 109 mg/dL      Calcium 8 2 mg/dL      Corrected Calcium 8 7 mg/dL      AST 56 U/L      ALT 49 U/L      Alkaline Phosphatase 66 U/L      Total Protein 8 1 g/dL      Albumin 3 4 g/dL      Total Bilirubin 1 00 mg/dL      eGFR 52 ml/min/1 73sq m     Narrative:      [facility] guidelines for Chronic Kidney Disease (CKD):     Stage 1 with normal or high GFR (GFR > 90 mL/min/1 73 square meters)    Stage 2 Mild CKD (GFR = 60-89 mL/min/1 73 square meters)   Stage 3A Moderate CKD (GFR = 45-59 mL/min/1 73 square meters)    Stage 3B Moderate CKD (GFR = 30-44 mL/min/1 73 square meters)    Stage 4 Severe CKD (GFR = 15-29 mL/min/1 73 square meters)    Stage 5 End Stage CKD (GFR <15 mL/min/1 73 square meters)  Note: GFR calculation is accurate only with a steady state creatinine    CBC and differential [[id number]]  (Abnormal) Collected: 05/20/21 1730    Lab Status: Final result Specimen: Blood from Arm, Right Updated: 05/20/21 1757     WBC 8 34 Thousand/uL      RBC 4 41 Million/uL      Hemoglobin 14 8 g/dL      Hematocrit 42 7 %      MCV 97 fL      MCH 33 6 pg      MCHC 34 7 g/dL      RDW 13 9 %      MPV 10 2 fL      Platelets 489 Thousands/uL      nRBC 0 /100 WBCs      Neutrophils Relative 65 %      Immat GRANS % 1 %      Lymphocytes Relative 14 %      Monocytes Relative 18 %      Eosinophils Relative 1 %      Basophils Relative 1 %      Neutrophils Absolute 5 51 Thousands/µL      Immature Grans Absolute 0 06 Thousand/uL      Lymphocytes Absolute 1 15 Thousands/µL      Monocytes Absolute 1 49 Thousand/µL      Eosinophils Absolute 0 08 Thousand/µL      Basophils Absolute 0 05 Thousands/µL     Procalcitonin with AM Reflex [[id number]] Collected: 05/20/21 1730    Lab Status: In process Specimen: Blood from Arm, Right Updated: 05/20/21 1738    Blood culture #2 [[id number]] Collected: 05/20/21 1720    Lab Status: In process Specimen: Blood from Arm, Right Updated: 05/20/21 1737    Blood culture #1 [[id number]] Collected: 05/20/21 1730    Lab Status:  In process Specimen: Blood from Arm, Right Updated: 05/20/21 1737                 XR chest 1 view portable   ED Interpretation by Britney Hua DO (05/20 1752)   No acute abnl                 Procedures  ECG 12 Lead Documentation Only    Date/Time: 5/20/2021 8:20 PM  Performed by: Britney Hua DO  Authorized by: Britney uHa DO     Indications / Diagnosis:  Sob  ECG reviewed by me, the ED Provider: yes    Patient location:  ED  Previous ECG:     Previous ECG:  Unavailable  Interpretation:     Interpretation: non-specific    Rate:     ECG rate:  103    ECG rate assessment: tachycardic    Rhythm:     Rhythm: sinus tachycardia    Ectopy:     Ectopy: none    QRS:     QRS axis:  Normal    QRS intervals:  Normal  Conduction:     Conduction: normal    ST segments:     ST segments:  Normal  T waves:     T waves: normal    Q waves:     Q waves:  III and aVF             ED Course                             SBIRT 22yo+      Most Recent Value   SBIRT (24 yo +)   In order to provide better care to our patients, we are screening all of our patients for alcohol and drug use  Would it be okay to ask you these screening questions? Yes Filed at: 05/20/2021 1732   Initial Alcohol Screen: US AUDIT-C    1  How often do you have a drink containing alcohol? 2 Filed at: 05/20/2021 1732   2  How many drinks containing alcohol do you have on a typical day you are drinking? 1 Filed at: 05/20/2021 1732   3a  Male UNDER 65: How often do you have five or more drinks on one occasion? 0 Filed at: 05/20/2021 1732   3b  FEMALE Any Age, or MALE 65+: How often do you have 4 or more drinks on one occassion? 0 Filed at: 05/20/2021 1732   Audit-C Score  3 Filed at: 05/20/2021 1732   KARINA: How many times in the past year have you    Used an illegal drug or used a prescription medication for non-medical reasons?   Never Filed at: 05/20/2021 1732                    MDM  Number of Diagnoses or Management Options  Acute bronchitis: new and requires workup  Elevated serum creatinine: new and requires workup     Amount and/or Complexity of Data Reviewed  Clinical lab tests: ordered and reviewed  Tests in the radiology section of CPT®: ordered and reviewed  Obtain history from someone other than the patient: yes    Patient Progress  Patient progress: improved      Disposition  Final diagnoses:   Acute bronchitis   Elevated serum creatinine     Time reflects when diagnosis was documented in both MDM as applicable and the Disposition within this note     Time User Action Codes Description Comment    5/20/2021  7:03 PM Jani Pacheco Add [J20 9] Acute bronchitis     5/20/2021  7:03 PM Jani Pacheco Add [R79 89] Elevated serum creatinine       ED Disposition     ED Disposition Condition Date/Time Comment    Discharge Stable Thu May 20, 2021  7:03 PM Fariba Baxter Sr  discharge to home/self care  Follow-up Information    None         Discharge Medication List as of 5/20/2021  7:04 PM      START taking these medications    Details   amoxicillin (AMOXIL) 500 mg capsule Take 1 capsule (500 mg total) by mouth 3 (three) times a day for 10 days, Starting Thu 5/20/2021, Until Sun 5/30/2021, Normal         CONTINUE these medications which have NOT CHANGED    Details   acetaminophen (TYLENOL) 325 mg tablet Take 650 mg by mouth every 6 (six) hours as needed for mild pain, Historical Med      amLODIPine (NORVASC) 5 mg tablet Take 5 mg by mouth daily, Historical Med      budesonide-formoterol (SYMBICORT) 160-4 5 mcg/act inhaler Inhale 2 puffs as needed  , Historical Med      celecoxib (CeleBREX) 100 mg capsule Take 100 mg by mouth daily  , Historical Med      Colchicine 0 6 MG CAPS every 24 hours, Historical Med      cyanocobalamin (VITAMIN B-12) 500 mcg tablet Take 500 mcg by mouth daily  , Historical Med      Fish Oil OIL Take 1,000 g by mouth daily  , Historical Med      omeprazole (PRILOSEC) 20 mg delayed release capsule Take 20 mg by mouth daily  , Starting Sat 2/19/2011, Historical Med      oxybutynin (DITROPAN-XL) 5 mg 24 hr tablet Take 1 tablet by mouth daily for 30 days, Starting 9/29/2016, Until Sat 10/29/16, Print      tamsulosin (FLOMAX) 0 4 mg Take 1 capsule by mouth daily with dinner for 30 days, Starting 11/9/2016, Until Fri 12/9/16, Print           No discharge procedures on file      PDMP Review     None          ED Provider  Electronically Signed by           Mallika Wahl DO  05/20/21 1244

## 2021-05-22 LAB
ATRIAL RATE: 103 BPM
P AXIS: 81 DEGREES
PR INTERVAL: 158 MS
QRS AXIS: -27 DEGREES
QRSD INTERVAL: 78 MS
QT INTERVAL: 302 MS
QTC INTERVAL: 395 MS
T WAVE AXIS: 73 DEGREES
VENTRICULAR RATE: 103 BPM

## 2021-05-22 PROCEDURE — 93010 ELECTROCARDIOGRAM REPORT: CPT | Performed by: INTERNAL MEDICINE

## 2021-05-25 LAB
BACTERIA BLD CULT: NORMAL
BACTERIA BLD CULT: NORMAL

## 2021-06-04 ENCOUNTER — TRANSCRIBE ORDERS (OUTPATIENT)
Dept: ADMINISTRATIVE | Facility: HOSPITAL | Age: 76
End: 2021-06-04

## 2021-06-04 DIAGNOSIS — R29.898 OTHER SYMPTOMS AND SIGNS INVOLVING THE MUSCULOSKELETAL SYSTEM: Primary | ICD-10-CM

## 2021-06-07 ENCOUNTER — TRANSCRIBE ORDERS (OUTPATIENT)
Dept: ADMINISTRATIVE | Facility: HOSPITAL | Age: 76
End: 2021-06-07

## 2021-06-07 ENCOUNTER — HOSPITAL ENCOUNTER (OUTPATIENT)
Dept: RADIOLOGY | Facility: HOSPITAL | Age: 76
Discharge: HOME | End: 2021-06-07
Payer: COMMERCIAL

## 2021-06-07 DIAGNOSIS — R05.9 COUGH: ICD-10-CM

## 2021-06-07 DIAGNOSIS — R05.9 COUGH: Primary | ICD-10-CM

## 2021-06-07 PROCEDURE — 71046 X-RAY EXAM CHEST 2 VIEWS: CPT

## 2021-06-10 ENCOUNTER — HOSPITAL ENCOUNTER (OUTPATIENT)
Dept: CT IMAGING | Facility: HOSPITAL | Age: 76
Discharge: HOME | End: 2021-06-10
Payer: COMMERCIAL

## 2021-06-10 DIAGNOSIS — R29.898 OTHER SYMPTOMS AND SIGNS INVOLVING THE MUSCULOSKELETAL SYSTEM: ICD-10-CM

## 2021-06-10 PROCEDURE — 70450 CT HEAD/BRAIN W/O DYE: CPT

## 2023-08-28 ENCOUNTER — HOSPITAL ENCOUNTER (INPATIENT)
Facility: HOSPITAL | Age: 78
LOS: 3 days | Discharge: HOME | DRG: 821 | End: 2023-08-31
Attending: EMERGENCY MEDICINE | Admitting: INTERNAL MEDICINE
Payer: COMMERCIAL

## 2023-08-28 ENCOUNTER — APPOINTMENT (EMERGENCY)
Dept: CT IMAGING | Facility: HOSPITAL | Age: 78
DRG: 821 | End: 2023-08-28
Payer: COMMERCIAL

## 2023-08-28 DIAGNOSIS — R18.8 CIRRHOSIS OF LIVER WITH ASCITES, UNSPECIFIED HEPATIC CIRRHOSIS TYPE (HCC): ICD-10-CM

## 2023-08-28 DIAGNOSIS — K76.9 HEPATIC LESION: ICD-10-CM

## 2023-08-28 DIAGNOSIS — R18.8 ASCITES: Primary | ICD-10-CM

## 2023-08-28 DIAGNOSIS — C79.9 METASTATIC CANCER (HCC): ICD-10-CM

## 2023-08-28 DIAGNOSIS — K74.60 CIRRHOSIS OF LIVER WITH ASCITES, UNSPECIFIED HEPATIC CIRRHOSIS TYPE (HCC): ICD-10-CM

## 2023-08-28 DIAGNOSIS — R93.5 ABNORMAL CT OF THE ABDOMEN: ICD-10-CM

## 2023-08-28 DIAGNOSIS — C85.90 LYMPHOMA (HCC): ICD-10-CM

## 2023-08-28 DIAGNOSIS — C85.10 LARGE B-CELL LYMPHOMA (HCC): ICD-10-CM

## 2023-08-28 DIAGNOSIS — E83.52 HYPERCALCEMIA: ICD-10-CM

## 2023-08-28 PROBLEM — M54.9 BACK PAIN: Status: ACTIVE | Noted: 2023-08-28

## 2023-08-28 PROBLEM — R10.9 ABDOMINAL PAIN: Status: ACTIVE | Noted: 2023-08-28

## 2023-08-28 PROBLEM — D69.3 CHRONIC ITP (IDIOPATHIC THROMBOCYTOPENIA) (HCC): Status: ACTIVE | Noted: 2023-08-28

## 2023-08-28 PROBLEM — D75.89 MACROCYTOSIS: Status: ACTIVE | Noted: 2023-08-28

## 2023-08-28 LAB
2HR DELTA HS TROPONIN: -1 NG/L
ALBUMIN SERPL BCP-MCNC: 3 G/DL (ref 3.5–5)
ALP SERPL-CCNC: 125 U/L (ref 34–104)
ALT SERPL W P-5'-P-CCNC: 37 U/L (ref 7–52)
ANION GAP SERPL CALCULATED.3IONS-SCNC: 12 MMOL/L
AST SERPL W P-5'-P-CCNC: 66 U/L (ref 13–39)
ATRIAL RATE: 87 BPM
ATRIAL RATE: 90 BPM
BASOPHILS # BLD AUTO: 0.05 THOUSANDS/ÂΜL (ref 0–0.1)
BASOPHILS NFR BLD AUTO: 1 % (ref 0–1)
BILIRUB SERPL-MCNC: 1.13 MG/DL (ref 0.2–1)
BILIRUB UR QL STRIP: NEGATIVE
BUN SERPL-MCNC: 22 MG/DL (ref 5–25)
CALCIUM ALBUM COR SERPL-MCNC: 13.4 MG/DL (ref 8.3–10.1)
CALCIUM SERPL-MCNC: 12.6 MG/DL (ref 8.4–10.2)
CARDIAC TROPONIN I PNL SERPL HS: 6 NG/L
CARDIAC TROPONIN I PNL SERPL HS: 7 NG/L
CHLORIDE SERPL-SCNC: 103 MMOL/L (ref 96–108)
CLARITY UR: CLEAR
CO2 SERPL-SCNC: 24 MMOL/L (ref 21–32)
COLOR UR: ABNORMAL
CREAT SERPL-MCNC: 1.31 MG/DL (ref 0.6–1.3)
EOSINOPHIL # BLD AUTO: 0.22 THOUSAND/ÂΜL (ref 0–0.61)
EOSINOPHIL NFR BLD AUTO: 3 % (ref 0–6)
ERYTHROCYTE [DISTWIDTH] IN BLOOD BY AUTOMATED COUNT: 15 % (ref 11.6–15.1)
GFR SERPL CREATININE-BSD FRML MDRD: 52 ML/MIN/1.73SQ M
GLUCOSE SERPL-MCNC: 100 MG/DL (ref 65–140)
GLUCOSE UR STRIP-MCNC: NEGATIVE MG/DL
HCT VFR BLD AUTO: 42.6 % (ref 36.5–49.3)
HGB BLD-MCNC: 13.9 G/DL (ref 12–17)
HGB UR QL STRIP.AUTO: NEGATIVE
IMM GRANULOCYTES # BLD AUTO: 0.04 THOUSAND/UL (ref 0–0.2)
IMM GRANULOCYTES NFR BLD AUTO: 1 % (ref 0–2)
KETONES UR STRIP-MCNC: NEGATIVE MG/DL
LEUKOCYTE ESTERASE UR QL STRIP: NEGATIVE
LYMPHOCYTES # BLD AUTO: 1.3 THOUSANDS/ÂΜL (ref 0.6–4.47)
LYMPHOCYTES NFR BLD AUTO: 15 % (ref 14–44)
MCH RBC QN AUTO: 32.7 PG (ref 26.8–34.3)
MCHC RBC AUTO-ENTMCNC: 32.6 G/DL (ref 31.4–37.4)
MCV RBC AUTO: 100 FL (ref 82–98)
MONOCYTES # BLD AUTO: 0.9 THOUSAND/ÂΜL (ref 0.17–1.22)
MONOCYTES NFR BLD AUTO: 10 % (ref 4–12)
NEUTROPHILS # BLD AUTO: 6.35 THOUSANDS/ÂΜL (ref 1.85–7.62)
NEUTS SEG NFR BLD AUTO: 70 % (ref 43–75)
NITRITE UR QL STRIP: NEGATIVE
NRBC BLD AUTO-RTO: 0 /100 WBCS
P AXIS: 44 DEGREES
P AXIS: 47 DEGREES
PH UR STRIP.AUTO: 6 [PH]
PLATELET # BLD AUTO: 196 THOUSANDS/UL (ref 149–390)
PMV BLD AUTO: 10 FL (ref 8.9–12.7)
POTASSIUM SERPL-SCNC: 4.3 MMOL/L (ref 3.5–5.3)
PR INTERVAL: 164 MS
PR INTERVAL: 178 MS
PROT SERPL-MCNC: 6.4 G/DL (ref 6.4–8.4)
PROT UR STRIP-MCNC: NEGATIVE MG/DL
QRS AXIS: -11 DEGREES
QRS AXIS: 0 DEGREES
QRSD INTERVAL: 86 MS
QRSD INTERVAL: 88 MS
QT INTERVAL: 334 MS
QT INTERVAL: 344 MS
QTC INTERVAL: 401 MS
QTC INTERVAL: 411 MS
RBC # BLD AUTO: 4.25 MILLION/UL (ref 3.88–5.62)
SODIUM SERPL-SCNC: 139 MMOL/L (ref 135–147)
SP GR UR STRIP.AUTO: >=1.05 (ref 1–1.03)
T WAVE AXIS: -1 DEGREES
T WAVE AXIS: 8 DEGREES
UROBILINOGEN UR STRIP-ACNC: <2 MG/DL
VENTRICULAR RATE: 86 BPM
VENTRICULAR RATE: 87 BPM
WBC # BLD AUTO: 8.86 THOUSAND/UL (ref 4.31–10.16)

## 2023-08-28 PROCEDURE — 36415 COLL VENOUS BLD VENIPUNCTURE: CPT | Performed by: EMERGENCY MEDICINE

## 2023-08-28 PROCEDURE — G1004 CDSM NDSC: HCPCS

## 2023-08-28 PROCEDURE — 85025 COMPLETE CBC W/AUTO DIFF WBC: CPT | Performed by: EMERGENCY MEDICINE

## 2023-08-28 PROCEDURE — 0DB68ZX EXCISION OF STOMACH, VIA NATURAL OR ARTIFICIAL OPENING ENDOSCOPIC, DIAGNOSTIC: ICD-10-PCS | Performed by: INTERNAL MEDICINE

## 2023-08-28 PROCEDURE — 71260 CT THORAX DX C+: CPT

## 2023-08-28 PROCEDURE — 99285 EMERGENCY DEPT VISIT HI MDM: CPT

## 2023-08-28 PROCEDURE — 74177 CT ABD & PELVIS W/CONTRAST: CPT

## 2023-08-28 PROCEDURE — 99285 EMERGENCY DEPT VISIT HI MDM: CPT | Performed by: EMERGENCY MEDICINE

## 2023-08-28 PROCEDURE — 93010 ELECTROCARDIOGRAM REPORT: CPT | Performed by: STUDENT IN AN ORGANIZED HEALTH CARE EDUCATION/TRAINING PROGRAM

## 2023-08-28 PROCEDURE — 96361 HYDRATE IV INFUSION ADD-ON: CPT

## 2023-08-28 PROCEDURE — 96360 HYDRATION IV INFUSION INIT: CPT

## 2023-08-28 PROCEDURE — 93005 ELECTROCARDIOGRAM TRACING: CPT

## 2023-08-28 PROCEDURE — 0DB78ZX EXCISION OF STOMACH, PYLORUS, VIA NATURAL OR ARTIFICIAL OPENING ENDOSCOPIC, DIAGNOSTIC: ICD-10-PCS | Performed by: INTERNAL MEDICINE

## 2023-08-28 PROCEDURE — 84484 ASSAY OF TROPONIN QUANT: CPT | Performed by: EMERGENCY MEDICINE

## 2023-08-28 PROCEDURE — 99255 IP/OBS CONSLTJ NEW/EST HI 80: CPT | Performed by: NURSE PRACTITIONER

## 2023-08-28 PROCEDURE — 99223 1ST HOSP IP/OBS HIGH 75: CPT | Performed by: INTERNAL MEDICINE

## 2023-08-28 PROCEDURE — 80053 COMPREHEN METABOLIC PANEL: CPT | Performed by: EMERGENCY MEDICINE

## 2023-08-28 PROCEDURE — 81003 URINALYSIS AUTO W/O SCOPE: CPT | Performed by: EMERGENCY MEDICINE

## 2023-08-28 RX ORDER — PANTOPRAZOLE SODIUM 40 MG/1
40 TABLET, DELAYED RELEASE ORAL
Status: DISCONTINUED | OUTPATIENT
Start: 2023-08-29 | End: 2023-08-29

## 2023-08-28 RX ORDER — ALLOPURINOL 100 MG/1
100 TABLET ORAL DAILY
Status: DISCONTINUED | OUTPATIENT
Start: 2023-08-28 | End: 2023-08-31 | Stop reason: HOSPADM

## 2023-08-28 RX ORDER — DOCUSATE SODIUM 100 MG/1
100 CAPSULE, LIQUID FILLED ORAL 2 TIMES DAILY
Status: DISCONTINUED | OUTPATIENT
Start: 2023-08-28 | End: 2023-08-31 | Stop reason: HOSPADM

## 2023-08-28 RX ORDER — TRAMADOL HYDROCHLORIDE 50 MG/1
50 TABLET ORAL
COMMUNITY
Start: 2023-04-21

## 2023-08-28 RX ORDER — TRAMADOL HYDROCHLORIDE 50 MG/1
50 TABLET ORAL EVERY 6 HOURS PRN
Status: DISCONTINUED | OUTPATIENT
Start: 2023-08-28 | End: 2023-08-31 | Stop reason: HOSPADM

## 2023-08-28 RX ORDER — FAMOTIDINE 20 MG/1
20 TABLET, FILM COATED ORAL
COMMUNITY

## 2023-08-28 RX ORDER — ALLOPURINOL 100 MG/1
100 TABLET ORAL DAILY
COMMUNITY

## 2023-08-28 RX ORDER — SODIUM CHLORIDE 9 MG/ML
100 INJECTION, SOLUTION INTRAVENOUS CONTINUOUS
Status: DISCONTINUED | OUTPATIENT
Start: 2023-08-28 | End: 2023-08-29

## 2023-08-28 RX ORDER — FAMOTIDINE 20 MG/1
20 TABLET, FILM COATED ORAL
Status: DISCONTINUED | OUTPATIENT
Start: 2023-08-28 | End: 2023-08-31 | Stop reason: HOSPADM

## 2023-08-28 RX ORDER — BUDESONIDE AND FORMOTEROL FUMARATE DIHYDRATE 160; 4.5 UG/1; UG/1
2 AEROSOL RESPIRATORY (INHALATION) 2 TIMES DAILY
Status: DISCONTINUED | OUTPATIENT
Start: 2023-08-28 | End: 2023-08-31 | Stop reason: HOSPADM

## 2023-08-28 RX ORDER — ONDANSETRON 2 MG/ML
4 INJECTION INTRAMUSCULAR; INTRAVENOUS EVERY 6 HOURS PRN
Status: DISCONTINUED | OUTPATIENT
Start: 2023-08-28 | End: 2023-08-31 | Stop reason: HOSPADM

## 2023-08-28 RX ORDER — HEPARIN SODIUM 5000 [USP'U]/ML
5000 INJECTION, SOLUTION INTRAVENOUS; SUBCUTANEOUS EVERY 8 HOURS SCHEDULED
Status: DISCONTINUED | OUTPATIENT
Start: 2023-08-28 | End: 2023-08-31 | Stop reason: HOSPADM

## 2023-08-28 RX ADMIN — HEPARIN SODIUM 5000 UNITS: 5000 INJECTION INTRAVENOUS; SUBCUTANEOUS at 21:49

## 2023-08-28 RX ADMIN — FAMOTIDINE 20 MG: 20 TABLET ORAL at 21:49

## 2023-08-28 RX ADMIN — SODIUM CHLORIDE 100 ML/HR: 0.9 INJECTION, SOLUTION INTRAVENOUS at 15:41

## 2023-08-28 RX ADMIN — IOHEXOL 100 ML: 350 INJECTION, SOLUTION INTRAVENOUS at 12:11

## 2023-08-28 RX ADMIN — BUDESONIDE AND FORMOTEROL FUMARATE DIHYDRATE 2 PUFF: 160; 4.5 AEROSOL RESPIRATORY (INHALATION) at 21:49

## 2023-08-28 RX ADMIN — SODIUM CHLORIDE 1000 ML: 0.9 INJECTION, SOLUTION INTRAVENOUS at 12:28

## 2023-08-28 RX ADMIN — HEPARIN SODIUM 5000 UNITS: 5000 INJECTION INTRAVENOUS; SUBCUTANEOUS at 15:39

## 2023-08-28 NOTE — ASSESSMENT & PLAN NOTE
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

## 2023-08-28 NOTE — ASSESSMENT & PLAN NOTE
· Patient's corrected calcium is 13.4:  C/w moderate hypercalcemia  · Most likely hypercalcemia of malignancy  · Pt is asymptomatic. Suspect this is not acute hypercalcemia due to malignancy that has progressed  · start IV fluids with isotonic saline and follow up calcium levels:   If not improved will order bisphosphonate  · Check ionized calcium  · EKG wnl  · No evidence of hyperreflexia, or symptoms

## 2023-08-28 NOTE — CONSULTS
Medical Oncology/Hematology Consult Note  Shalonda Pruett Sr., male, 70 y. o., 1945,  [address]/[place] [address] M*, [phone]     Reason for consultation: abnormal imaging    Patient is a 77-year-old male with PMH of hypertension, GERD, COPD, chronic ITP (no hematology records available on [facility]) , and tobacco use. He presented to the ER for evaluation of abdominal pain that has been chronically ongoing for the past two months. Along with abdominal pain, he was experiencing intermittent nausea and vomiting, loss of appetite, early satiety and unintentional 14-lb weight loss over past 2 months. In addition, he was having generalized weakness and fatigue. He drinks alcohol occasionally. Patient reported that he had an EGD done about 5 years ago that was unremarkable. 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 GI was consulted to perform tissue procurement for diagnosis/possible endoscopy. His CMP showed hypercalcemia as well. CT imaging showed a subcentimeter sclerotic focus in the lateral right 9th rib which is indeterminate for metastasis. Oncology was consulted to offer recommendations regarding abnormal imaging. Assessment and Plan:  1. 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 · Cluster of nodules in the right upper lobe are concerning for pulmonary metastasis. ·  Ventral abdominal wall nodule is new since 2016 and is concerning for metastasis. · Right adrenal adenoma has slightly increased in size since October 2016 and may represent a metastasis. · Subcentimeter sclerotic focus in the lateral right 9th rib is indeterminate for metastasis. ·  Hepatic cirrhosis. Possible hepatic lesion    2.  Hypercalcemia  -Corrected calcium 12 <13.4  -malignancy-induced vs. Decreased oral intake vs. both  -CT imaging showed bone involvement with possible metastasis. 3. Hx of Chronic ITP  -Not evident at this time    PLAN:  -s/p EGD on 8/29/23 with GI. Await biopsy results. -Recommend exploration of possible hepatic lesion for tissue procurement in relation to staging and treatment goal. Will consult IR for biopsy and possible need for further imaging.  -Agree with aggressive hydration. Recommend bisphosphonate, but may consider adjunct calcitonin as bisphosphonate results may not be immediate. -CEA and CA 19-9 pending    CMP (8/28/23)  -alkaline phosphatase 125  -Total bilirubin 1.13  -chronic elevation of AST since 2021. Of note, 66. -ALT 37    CBC (8/28/23)  -unremarkable with the exception of MCV of 100, mild macrocytosis. Otherwise, hemoglobin, WBC and platelets all within normal limits. Folate and Vitamin B-12 pending    Outpatient follow up plan: to be set up by inpatient Cancer Coordinator, IDALMIS Salazar    Thank you for this consult. Jacoby Mon DNP, MAHI  Hematology-Oncology     Interval Hx:  Patient reported being fairly active at home. He owns a 2-acre land, therefore, finds himself doing constant upkeep with his fruit and vegetable gardening along with his wife. Patient verbalized understanding the plan for awaiting biopsy status post EGD, but will need to explore possible liver biopsy as this may affect staging and treatment goals. He is amenable to the procedure and wants "everything done to fight this cancer."  Wife at the bedside. Discussed possible scenarios once treatment has commenced. They requested to follow-up at [facility] in [facility]; they live in [facility]. History of present illness:  Malina Ball is a 70 y.o. male with PMH of hypertension, GERD, COPD, chronic ITP, and tobacco use.  He presented to the ER for evaluation of abdominal pain that has been chronically ongoing for the past two months. Along with abdominal pain, he was experiencing intermittent nausea and vomiting, loss of appetite, early satiety and unintentional 14-lb weight loss over past 2 months. In addition, he was having generalized weakness and fatigue. He drinks alcohol occasionally. Patient reported that he had an EGD done about 5 years ago that was unremarkable. 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 GI was consulted to perform tissue procurement for diagnosis/possible endoscopy. His CMP showed hypercalcemia as well. CT imaging showed a subcentimeter sclerotic focus in the lateral right 9th rib which is indeterminate for metastasis. Oncology was consulted to offer recommendations regarding abnormal imaging. Review of Systems:   Review of Systems   Constitutional: Negative for chills and fever. HENT: Negative for ear pain and sore throat. Eyes: Negative for pain and visual disturbance. Respiratory: Negative for cough and shortness of breath. Cardiovascular: Negative for chest pain and palpitations. Gastrointestinal: Positive for abdominal distention. Negative for abdominal pain and vomiting. Genitourinary: Negative for dysuria and hematuria. Musculoskeletal: Negative for arthralgias and back pain. Skin: Negative for color change and rash. Neurological: Negative for seizures and syncope. All other systems reviewed and are negative. Oncology History:   Cancer Staging   No matching staging information was found for the patient. Oncology History    No history exists.        Past Medical History:   Past Medical History:   Diagnosis Date   • Acute right flank pain    • Arthritis     generalized   • Asthma     uses inhaler   • Back pain    • Cellulitis    • Chronic ITP (idiopathic thrombocytopenia) (HCC)    • Chronic kidney disease     stones   • COPD (chronic obstructive pulmonary disease) ([address])    • Full dentures    • GERD (gastroesophageal reflux disease)    • Gout    • Hearing difficulty of both ears    • Hypertension    • ITP (idiopathic thrombocytopenic purpura)    • Lumbar disc herniation    • Renal mass    • Spinal stenosis of lumbar region    • Tobacco abuse    • Wears glasses        Past Surgical History:   Procedure Laterality Date   • CARPAL TUNNEL RELEASE Bilateral    • CHOLECYSTECTOMY     • FINGER SURGERY Right     index finger   • LUMBAR LAMINECTOMY      spinal stenosis    • OK CYSTO/URETERO W/LITHOTRIPSY &INDWELL STENT INSRT Right 11/9/2016    Procedure: CYSTOSCOPY URETEROSCOPY WITH LITHOTRIPSY, RETROGRADE PYELOGRAM; BASKET STONE EXTRACTION ;  Surgeon: Scott Hemphill MD;  Location: [facility];  Service: Urology   • OK CYSTO/URETERO W/LITHOTRIPSY &INDWELL STENT INSRT Right 9/28/2016    Procedure: CYSTOSCOPY, URETEROSCOPY, DILATION  OF URETERAL STRICTURE, RETROGRADE PYELOGRAM, AND INSERTION STENT URETERAL;  Surgeon: Scott Hemphill MD;  Location: [facility];  Service: Urology       History reviewed. No pertinent family history.     Social History     Socioeconomic History   • Marital status: /Civil Union     Spouse name: None   • Number of children: None   • Years of education: None   • Highest education level: None   Occupational History   • Occupation: [employer]   Tobacco Use   • Smoking status: Light Smoker     Types: Pipe   • Smokeless tobacco: Never   • Tobacco comments:     pipe once a day   Vaping Use   • Vaping Use: Never used   Substance and Sexual Activity   • Alcohol use: No   • Drug use: No   • Sexual activity: None   Other Topics Concern   • None   Social History Narrative   • None     Social Determinants of Health     Financial Resource Strain: Not on file   Food Insecurity: Not on file   Transportation Needs: Not on file   Physical Activity: Not on file   Stress: Not on file   Social Connections: Not on file   Intimate Partner Violence: Not on file   Housing Stability: Not on file         Current Facility-Administered Medications:   •  allopurinol (ZYLOPRIM) tablet 100 mg, 100 mg, Oral, Daily, Melissa Acuna MD  •  budesonide-formoterol (SYMBICORT) 160-4.5 mcg/act inhaler 2 puff, 2 puff, Inhalation, BID, Melissa Acuna MD  •  cyanocobalamin (VITAMIN B-12) tablet 500 mcg, 500 mcg, Oral, Daily, Melissa Acuna MD  •  docusate sodium (COLACE) capsule 100 mg, 100 mg, Oral, BID, Melissa Acuna MD  •  famotidine (PEPCID) tablet 20 mg, 20 mg, Oral, HS, Melissa Acuna MD  •  heparin (porcine) subcutaneous injection 5,000 Units, 5,000 Units, Subcutaneous, Q8H [address], 5,000 Units at 08/28/23 1539 **AND** Platelet count, , , Once, Melissa Acuna MD  •  ondansetron (ZOFRAN) injection 4 mg, 4 mg, Intravenous, Q6H PRN, Melissa Acuna MD  •  [START ON 8/29/2023] pantoprazole (PROTONIX) EC tablet 40 mg, 40 mg, Oral, Early Morning, Melissa Acuna MD  •  sodium chloride 0.9 % infusion, 100 mL/hr, Intravenous, Continuous, Melissa Acuna MD, Last Rate: 100 mL/hr at 08/28/23 1541, 100 mL/hr at 08/28/23 1541  •  traMADol (ULTRAM) tablet 50 mg, 50 mg, Oral, Q6H PRN, Melissa Acuna MD    Medications Prior to Admission   Medication   • acetaminophen (TYLENOL) 325 mg tablet   • amLODIPine (NORVASC) 5 mg tablet   • celecoxib (CeleBREX) 100 mg capsule   • cyanocobalamin (VITAMIN B-12) 500 mcg tablet   • famotidine (PEPCID) 20 mg tablet   • omeprazole (PRILOSEC) 20 mg delayed release capsule   • traMADol (ULTRAM) 50 mg tablet   • allopurinol (ZYLOPRIM) 100 mg tablet   • budesonide-formoterol (SYMBICORT) 160-4.5 mcg/act inhaler   • Cholecalciferol 50 MCG (2000 UT) CAPS   • Colchicine 0.6 MG CAPS   • Fish Oil OIL   • oxybutynin (DITROPAN-XL) 5 mg 24 hr tablet   • tamsulosin (FLOMAX) 0.4 mg       Allergies   Allergen Reactions   • Codeine GI Intolerance     Nausea and bleeding   • Atorvastatin Hives   • Fenofibrate Hives     Tricor causes muscle pain   • Hydrochlorothiazide Hives   • Prednisone Hives and Other (See Comments)     sensitivity           Physical Exam:     /56   Pulse 93   Temp 97.8 °F (36.6 °C)   Resp 16   SpO2 95%     Physical Exam  HENT:      Head: Normocephalic. Mouth/Throat:      Mouth: Mucous membranes are moist.   Eyes:      Conjunctiva/sclera: Conjunctivae normal.   Cardiovascular:      Rate and Rhythm: Normal rate. Pulses: Normal pulses. Heart sounds: Normal heart sounds. Pulmonary:      Effort: Pulmonary effort is normal.      Breath sounds: Normal breath sounds. Abdominal:      General: Bowel sounds are normal. There is distension. Musculoskeletal:         General: Normal range of motion. Skin:     General: Skin is warm and dry. Neurological:      General: No focal deficit present. Mental Status: He is alert and oriented to person, place, and time. Motor: Weakness present. Psychiatric:         Mood and Affect: Mood normal.         Behavior: Behavior normal.         Thought Content:  Thought content normal.         Judgment: Judgment normal.         Recent Results (from the past 48 hour(s))   CBC and differential    Collection Time: 08/28/23 10:47 AM   Result Value Ref Range    WBC 8.86 4.31 - 10.16 Thousand/uL    RBC 4.25 3.88 - 5.62 Million/uL    Hemoglobin 13.9 12.0 - 17.0 g/dL    Hematocrit 42.6 36.5 - 49.3 %     (H) 82 - 98 fL    MCH 32.7 26.8 - 34.3 pg    MCHC 32.6 31.4 - 37.4 g/dL    RDW 15.0 11.6 - 15.1 %    MPV 10.0 8.9 - 12.7 fL    Platelets 138 210 - 888 Thousands/uL    nRBC 0 /100 WBCs    Neutrophils Relative 70 43 - 75 %    Immat GRANS % 1 0 - 2 %    Lymphocytes Relative 15 14 - 44 %    Monocytes Relative 10 4 - 12 %    Eosinophils Relative 3 0 - 6 %    Basophils Relative 1 0 - 1 %    Neutrophils Absolute 6.35 1.85 - 7.62 Thousands/µL    Immature Grans Absolute 0.04 0.00 - 0.20 Thousand/uL    Lymphocytes Absolute 1.30 0.60 - 4.47 Thousands/µL    Monocytes Absolute 0.90 0.17 - 1.22 Thousand/µL Eosinophils Absolute 0.22 0.00 - 0.61 Thousand/µL    Basophils Absolute 0.05 0.00 - 0.10 Thousands/µL   HS Troponin 0hr (reflex protocol)    Collection Time: 08/28/23 10:47 AM   Result Value Ref Range    hs TnI 0hr 7 "Refer to ACS Flowchart"- see link ng/L   Comprehensive metabolic panel    Collection Time: 08/28/23 10:47 AM   Result Value Ref Range    Sodium 139 135 - 147 mmol/L    Potassium 4.3 3.5 - 5.3 mmol/L    Chloride 103 96 - 108 mmol/L    CO2 24 21 - 32 mmol/L    ANION GAP 12 mmol/L    BUN 22 5 - 25 mg/dL    Creatinine 1.31 (H) 0.60 - 1.30 mg/dL    Glucose 100 65 - 140 mg/dL    Calcium 12.6 (HH) 8.4 - 10.2 mg/dL    Corrected Calcium 13.4 (HH) 8.3 - 10.1 mg/dL    AST 66 (H) 13 - 39 U/L    ALT 37 7 - 52 U/L    Alkaline Phosphatase 125 (H) 34 - 104 U/L    Total Protein 6.4 6.4 - 8.4 g/dL    Albumin 3.0 (L) 3.5 - 5.0 g/dL    Total Bilirubin 1.13 (H) 0.20 - 1.00 mg/dL    eGFR 52 ml/min/1.73sq m   HS Troponin I 2hr    Collection Time: 08/28/23 12:52 PM   Result Value Ref Range    hs TnI 2hr 6 "Refer to ACS Flowchart"- see link ng/L    Delta 2hr hsTnI -1 <20 ng/L   UA w Reflex to Microscopic w Reflex to Culture    Collection Time: 08/28/23  2:05 PM    Specimen: Urine, Clean Catch   Result Value Ref Range    Color, UA Light Yellow     Clarity, UA Clear     Specific Gravity, UA >=1.050 (H) 1.003 - 1.030    pH, UA 6.0 4.5, 5.0, 5.5, 6.0, 6.5, 7.0, 7.5, 8.0    Leukocytes, UA Negative Negative    Nitrite, UA Negative Negative    Protein, UA Negative Negative mg/dl    Glucose, UA Negative Negative mg/dl    Ketones, UA Negative Negative mg/dl    Urobilinogen, UA <2.0 <2.0 mg/dl mg/dl    Bilirubin, UA Negative Negative    Occult Blood, UA Negative Negative       CT chest abdomen pelvis w contrast    Result Date: 8/28/2023  Narrative: CT CHEST, ABDOMEN AND PELVIS WITH IV CONTRAST INDICATION:   abdominal pain and bloating/ dyspnea on exertion. COMPARISON: CT abdomen pelvis October 10, 2016.  No prior CT chest available for comparison TECHNIQUE: CT examination of the chest, abdomen and pelvis was performed. Multiplanar 2D reformatted images were created from the source data. This examination, like all CT scans performed in the [facility], was performed utilizing techniques to minimize radiation dose exposure, including the use of iterative reconstruction and automated exposure control. Radiation dose length product (DLP) for this visit:  2374 mGy-cm IV Contrast:  100 mL of iohexol (OMNIPAQUE) Enteric Contrast: Enteric contrast was not administered. FINDINGS: CHEST LUNGS: Mild emphysema. Cluster of masses/nodules in the right upper lobe broadly abuts the pleura and measures up to 4.3 x 3.2 cm (series 6, image 31). PLEURA:  Unremarkable. HEART/GREAT VESSELS: Heart is unremarkable for patient's age. No thoracic aortic aneurysm. MEDIASTINUM AND DEBRA: Anterior mediastinal nodule measures 1.4 x 1.5 cm (series 2, image 44). CHEST WALL AND LOWER NECK:  Unremarkable. ABDOMEN LIVER/BILIARY TREE: Hepatic cirrhosis. Possibly contour deforming lesion along the posterior aspect of hepatic segment 6 measures 1.9 x 1.6 cm (series 2, image 129). 1.6 cm low-attenuation lesion (Hounsfield units of 10) in the right hepatic lobe is similar to 2016 and is most compatible a cyst (series 2, image 108). Patent hepatic and portal veins. No biliary ductal dilation. GALLBLADDER: Post cholecystectomy SPLEEN:  Unremarkable. PANCREAS:  Unremarkable. ADRENAL GLANDS: Right adrenal lesion measures a 1.1 x 0.9 cm, previously 1.0 x 0.6 cm on October 10, 2016 whereupon it had average Hounsfield units of 3 on the unenhanced portion of the study (series 2, image 112). No left adrenal lesion. KIDNEYS/URETERS: Horseshoe kidney. No hydroureteronephrosis. STOMACH AND BOWEL: Multiple areas of mural thickening and heterogeneous attenuation throughout the stomach. No bowel obstruction. Colonic diverticulosis. APPENDIX:  No findings to suggest appendicitis. ABDOMINOPELVIC CAVITY: Omental mass centered in the left upper quadrant surrounding the greater and lesser curvatures of the stomach measures approximately 8.9 x 5.2 cm laterally where it abuts the spleen and surrounds the arteries (series 2, image 116),  and 9.8 x 4.0 cm anteriorly (series 2, image 125). Multiple similar masses/nodes along the gastrohepatic ligament measure up to 5.1 x 2.0 cm (series 2, image 101). Similar-appearing mesenteric nodule or mass to the right of midline measures 2.4 x 1.8 cm  (series 2, image 132). The left sided nodularity abuts the proximal descending colon (for example series 601, image 101). Moderate volume ascites. VESSELS:  Unremarkable for patient's age. PELVIS REPRODUCTIVE ORGANS:  Unremarkable for patient's age. URINARY BLADDER:  Unremarkable. ABDOMINAL WALL/INGUINAL REGIONS: New nodule in the right ventral lateral abdominal wall measures 1.6 x 1.6 cm (series 2, image 128). OSSEOUS STRUCTURES: 0.2 cm sclerosis in the lateral right 9th rib is new since October 10, 2016 (series 2, image 107). Multilevel degenerative change of the spine. Impression: 1. 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 2.  Cluster of nodules in the right upper lobe are concerning for pulmonary metastasis. 3.  Ventral abdominal wall nodule is new since 2016 and is concerning for metastasis. 4.  A right adrenal adenoma has slightly increased in size since October 10, 2016 and may represent a metastasis. 5.  Subcentimeter sclerotic focus in the lateral right 9th rib is indeterminate for metastasis. 6.  Hepatic cirrhosis. 7.  Possible hepatic lesion. Contrast-enhanced MRI can be considered for further assessment should it guide clinical management. 8.  Moderate volume ascites can be due to the metastatic disease and/or liver disease.  I personally discussed this study with EDE MANRIQUEZ on 8/28/2023 1:40 PM. Workstation performed: [id number]       Labs and pertinent reports reviewed. This note has been generated by voice recognition software system. Therefore, there may be spelling, grammar, and or syntax errors. Please contact if questions arise.

## 2023-08-28 NOTE — ED PROVIDER NOTES
Pt Name: Vasiliy Pleitez. MRN: [id number]  Birthdate 1945  Age/Sex: 70 y.o. male  Date of evaluation: 8/28/2023  PCP: Kayy Pino MD    [address]    Chief Complaint   Patient presents with   • Abdominal Pain     Patient c/o mid abdominal pain that began a few weeks ago. 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 Patient unable to keep any meat down. HPI    Vishal Speaker presents to the Emergency Department complaining of abdominal distension. He feels full and has been able to eat fruit but not meat at all. He was recently treated for Lyme with 28 days of doxy and still feels weak and fatigued. This has been ongoing for weeks to months. He is sleeping a lot more than usual as well.        HPI      Past Medical and Surgical History    Past Medical History:   Diagnosis Date   • Acute right flank pain    • Arthritis     generalized   • Asthma     uses inhaler   • Back pain    • Cellulitis    • Chronic ITP (idiopathic thrombocytopenia) ([facility])    • Chronic kidney disease     stones   • COPD (chronic obstructive pulmonary disease) ([facility])    • Full dentures    • GERD (gastroesophageal reflux disease)    • Gout    • Hearing difficulty of both ears    • Hypertension    • ITP (idiopathic thrombocytopenic purpura)    • Lumbar disc herniation    • Renal mass    • Spinal stenosis of lumbar region    • Tobacco abuse    • Wears glasses        Past Surgical History:   Procedure Laterality Date   • CARPAL TUNNEL RELEASE Bilateral    • CHOLECYSTECTOMY     • FINGER SURGERY Right     index finger   • LUMBAR LAMINECTOMY      spinal stenosis    • MS CYSTO/URETERO W/LITHOTRIPSY &INDWELL STENT INSRT Right 11/9/2016    Procedure: CYSTOSCOPY URETEROSCOPY WITH LITHOTRIPSY, RETROGRADE PYELOGRAM; BASKET STONE EXTRACTION ;  Surgeon: Jay Jay Cast MD;  Location: [facility];  Service: Urology   • MS CYSTO/URETERO W/LITHOTRIPSY &INDWELL STENT INSRT Right 9/28/2016    Procedure: CYSTOSCOPY, URETEROSCOPY, DILATION  OF URETERAL STRICTURE, RETROGRADE PYELOGRAM, AND INSERTION STENT URETERAL;  Surgeon: Dusty Acevedo MD;  Location: [facility];  Service: Urology       History reviewed. No pertinent family history. Social History     Tobacco Use   • Smoking status: Light Smoker     Types: Pipe   • Smokeless tobacco: Never   • Tobacco comments:     pipe once a day   Vaping Use   • Vaping Use: Never used   Substance Use Topics   • Alcohol use: No   • Drug use: No         .    Allergies    Allergies   Allergen Reactions   • Codeine GI Intolerance     Nausea and bleeding   • Atorvastatin Hives   • Fenofibrate Hives     Tricor causes muscle pain   • Hydrochlorothiazide Hives   • Prednisone Hives and Other (See Comments)     sensitivity         Home Medications    Prior to Admission medications    Medication Sig Start Date End Date Taking? Authorizing Provider   acetaminophen (TYLENOL) 325 mg tablet Take 650 mg by mouth every 6 (six) hours as needed for mild pain    Historical Provider, MD   amLODIPine (NORVASC) 5 mg tablet Take 5 mg by mouth daily    Historical Provider, MD   budesonide-formoterol (SYMBICORT) 160-4.5 mcg/act inhaler Inhale 2 puffs as needed      Historical Provider, MD   celecoxib (CeleBREX) 100 mg capsule Take 100 mg by mouth daily      Historical Provider, MD   Colchicine 0.6 MG CAPS every 24 hours    Historical Provider, MD   cyanocobalamin (VITAMIN B-12) 500 mcg tablet Take 500 mcg by mouth daily. Historical Provider, MD   Fish Oil OIL Take 1,000 g by mouth daily      Historical Provider, MD   omeprazole (PRILOSEC) 20 mg delayed release capsule Take 20 mg by mouth daily   2/19/11   Historical Provider, MD   oxybutynin (DITROPAN-XL) 5 mg 24 hr tablet Take 1 tablet by mouth daily for 30 days  Patient taking differently: Take 5 mg by mouth daily Does not take any longer.   LD. 10/29/16  9/29/16 10/29/16  Jolene Eller MD   tamsulosin (FLOMAX) 0.4 mg Take 1 capsule by mouth daily with dinner for 30 days 11/9/16 12/9/16  Jie Doherty MD           Review of Systems    Review of Systems   Constitutional: Positive for activity change, appetite change, fatigue and unexpected weight change. Negative for chills, diaphoresis and fever. HENT: Negative for ear pain and sore throat. Eyes: Negative for pain and visual disturbance. Respiratory: Positive for shortness of breath. Negative for cough. Cardiovascular: Negative for chest pain and palpitations. Gastrointestinal: Positive for abdominal distention and abdominal pain. Negative for vomiting. Genitourinary: Negative for dysuria and hematuria. Musculoskeletal: Negative for arthralgias and back pain. Skin: Negative for color change and rash. Neurological: Positive for weakness. Negative for seizures and syncope. All other systems reviewed and are negative. Physical Exam      ED Triage Vitals   Temperature Pulse Respirations Blood Pressure SpO2   08/28/23 1021 08/28/23 1021 08/28/23 1021 08/28/23 1021 08/28/23 1021   98.4 °F (36.9 °C) 100 20 116/63 93 %      Temp Source Heart Rate Source Patient Position - Orthostatic VS BP Location FiO2 (%)   08/28/23 1021 08/28/23 1021 08/28/23 1021 08/28/23 1021 --   Oral Monitor Lying Left arm       Pain Score       08/28/23 1225       1               Physical Exam  Vitals and nursing note reviewed. Constitutional:       General: He is not in acute distress. Appearance: He is well-developed. He is not diaphoretic. HENT:      Head: Normocephalic and atraumatic. Nose: Nose normal.   Eyes:      Conjunctiva/sclera: Conjunctivae normal.      Pupils: Pupils are equal, round, and reactive to light. Cardiovascular:      Rate and Rhythm: Normal rate and regular rhythm. Heart sounds: Normal heart sounds. No murmur heard. No friction rub. No gallop.    Pulmonary:      Effort: Pulmonary effort is normal. No respiratory distress. Breath sounds: Normal breath sounds. No wheezing or rales. Abdominal:      General: Abdomen is protuberant. Bowel sounds are normal.      Palpations: Abdomen is rigid. Tenderness: There is generalized abdominal tenderness. There is no guarding or rebound. Musculoskeletal:         General: Normal range of motion. Cervical back: Normal range of motion and neck supple. Skin:     General: Skin is warm and dry. Neurological:      Mental Status: He is alert and oriented to person, place, and time. Psychiatric:         Behavior: Behavior normal.         Assessment and Plan    Stevie Rivera Sr. is a 70 y.o. male who presents with weakness and decreased appetite with abdominal bloating and discomfort. Physical examination remarkable for same. Differential diagnosis (not completely inclusive) includes ascites/ malignancy/ obstructive process. Plan will be to perform diagnostic testing and treat symptomatically. MDM  Number of Diagnoses or Management Options  Abnormal CT of the abdomen  Ascites  Diagnosis management comments: I saw and evaluated patient. Labs ordered and reviewed. Ekg ordered and reviewed. Imaging ordered, personally reviewed and discussed with radiologist.   I discussed results with patient and family. Discussed with SLIM for admission. Diagnostic Results    EKG:  normal EKG, normal sinus rhythm, unchanged from previous tracings    EKG INTERPRETATION  EKG Interpretation    Rate: 86 BPM  Rhythm: sinus  Axis: normal  Intervals: Normal, no blocks, QTc 411ms  T waves: nonspecific  ST segments: nonspecific    Impression: nondiagnostic EKG. EKG for comparison: no significant change is seen. EKG interpreted by me. Interpretation by Zhanna Fraser DO  EKG reviewed and interpreted independently.     Labs:    Results for orders placed or performed during the hospital encounter of 08/28/23   CBC and differential   Result Value Ref Range    WBC 8.86 4.31 - 10.16 Thousand/uL    RBC 4.25 3.88 - 5.62 Million/uL    Hemoglobin 13.9 12.0 - 17.0 g/dL    Hematocrit 42.6 36.5 - 49.3 %     (H) 82 - 98 fL    MCH 32.7 26.8 - 34.3 pg    MCHC 32.6 31.4 - 37.4 g/dL    RDW 15.0 11.6 - 15.1 %    MPV 10.0 8.9 - 12.7 fL    Platelets 216 579 - 076 Thousands/uL    nRBC 0 /100 WBCs    Neutrophils Relative 70 43 - 75 %    Immat GRANS % 1 0 - 2 %    Lymphocytes Relative 15 14 - 44 %    Monocytes Relative 10 4 - 12 %    Eosinophils Relative 3 0 - 6 %    Basophils Relative 1 0 - 1 %    Neutrophils Absolute 6.35 1.85 - 7.62 Thousands/µL    Immature Grans Absolute 0.04 0.00 - 0.20 Thousand/uL    Lymphocytes Absolute 1.30 0.60 - 4.47 Thousands/µL    Monocytes Absolute 0.90 0.17 - 1.22 Thousand/µL    Eosinophils Absolute 0.22 0.00 - 0.61 Thousand/µL    Basophils Absolute 0.05 0.00 - 0.10 Thousands/µL   HS Troponin 0hr (reflex protocol)   Result Value Ref Range    hs TnI 0hr 7 "Refer to ACS Flowchart"- see link ng/L   Comprehensive metabolic panel   Result Value Ref Range    Sodium 139 135 - 147 mmol/L    Potassium 4.3 3.5 - 5.3 mmol/L    Chloride 103 96 - 108 mmol/L    CO2 24 21 - 32 mmol/L    ANION GAP 12 mmol/L    BUN 22 5 - 25 mg/dL    Creatinine 1.31 (H) 0.60 - 1.30 mg/dL    Glucose 100 65 - 140 mg/dL    Calcium 12.6 (HH) 8.4 - 10.2 mg/dL    Corrected Calcium 13.4 (HH) 8.3 - 10.1 mg/dL    AST 66 (H) 13 - 39 U/L    ALT 37 7 - 52 U/L    Alkaline Phosphatase 125 (H) 34 - 104 U/L    Total Protein 6.4 6.4 - 8.4 g/dL    Albumin 3.0 (L) 3.5 - 5.0 g/dL    Total Bilirubin 1.13 (H) 0.20 - 1.00 mg/dL    eGFR 52 ml/min/1.73sq m   HS Troponin I 2hr   Result Value Ref Range    hs TnI 2hr 6 "Refer to ACS Flowchart"- see link ng/L    Delta 2hr hsTnI -1 <20 ng/L       All labs reviewed and utilized in the medical decision making process    Radiology:    CT chest abdomen pelvis w contrast   Final Result      1.   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 2.  Cluster of nodules in the right upper lobe are concerning for pulmonary metastasis. 3.  Ventral abdominal wall nodule is new since 2016 and is concerning for metastasis. 4.  A right adrenal adenoma has slightly increased in size since October 10, 2016 and may represent a metastasis. 5.  Subcentimeter sclerotic focus in the lateral right 9th rib is indeterminate for metastasis. 6.  Hepatic cirrhosis. 7.  Possible hepatic lesion. Contrast-enhanced MRI can be considered for further assessment should it guide clinical management. 8.  Moderate volume ascites can be due to the metastatic disease and/or liver disease. I personally discussed this study with EDE MANRIQUEZ on 8/28/2023 1:40 PM.            Workstation performed: [id number]             All radiology studies independently viewed by me and interpreted by the radiologist.    Procedure    Procedures      ED Course of Care and Re-Assessments      Medications   iohexol (OMNIPAQUE) 350 MG/ML injection (SINGLE-DOSE) 100 mL (100 mL Intravenous Given 8/28/23 1211)   sodium chloride 0.9 % bolus 1,000 mL (0 mL Intravenous Stopped 8/28/23 1401)           FINAL IMPRESSION    Final diagnoses:   Ascites   Abnormal CT of the abdomen         DISPOSITION/PLAN      Time reflects when diagnosis was documented in both MDM as applicable and the Disposition within this note     Time User Action Codes Description Comment    8/28/2023  2:04 PM Aylin Marsh [R18.8] Ascites     8/28/2023  2:05 PM Aylin DIETRICH Add [R93.5] Abnormal CT of the abdomen       ED Disposition     ED Disposition   Admit    Condition   Stable    Date/Time   Mon Aug 28, 2023  2:04 PM    Comment   Case was discussed with JESUS and the patient's admission status was agreed to be Admission Status: inpatient status to the service of Dr. Esther Rowley . Follow-up Information    None                  Stacey Urbina, DO Stacey Urbina DO  08/28/23 1925

## 2023-08-28 NOTE — ASSESSMENT & PLAN NOTE
· Patient with a history of chronic low back pain with previous spinal surgeries  · Patient notes he has chronic numbness of the left lower extremity extending from the knee distally, which his previous specialist attributed to a "bulging disc"  · Has received injections in the past

## 2023-08-28 NOTE — PLAN OF CARE
Problem: Potential for Falls  Goal: Patient will remain free of falls  Description: INTERVENTIONS:  - Educate patient/family on patient safety including physical limitations  - Instruct patient to call for assistance with activity   - Consult OT/PT to assist with strengthening/mobility   - Keep Call bell within reach  - Keep bed low and locked with side rails adjusted as appropriate  - Keep care items and personal belongings within reach  - Initiate and maintain comfort rounds  - Make Fall Risk Sign visible to staff  - Offer Toileting every 2 Hours, in advance of need  - Initiate/Maintain bed alarm  - Obtain necessary fall risk management equipment  - Apply yellow socks and bracelet for high fall risk patients  - Consider moving patient to room near nurses station  Outcome: Progressing     Problem: MOBILITY - ADULT  Goal: Maintain or return to baseline ADL function  Description: INTERVENTIONS:  -  Assess patient's ability to carry out ADLs; assess patient's baseline for ADL function and identify physical deficits which impact ability to perform ADLs (bathing, care of mouth/teeth, toileting, grooming, dressing, etc.)  - Assess/evaluate cause of self-care deficits   - Assess range of motion  - Assess patient's mobility; develop plan if impaired  - Assess patient's need for assistive devices and provide as appropriate  - Encourage maximum independence but intervene and supervise when necessary  - Involve family in performance of ADLs  - Assess for home care needs following discharge   - Consider OT consult to assist with ADL evaluation and planning for discharge  - Provide patient education as appropriate  Outcome: Progressing  Goal: Maintains/Returns to pre admission functional level  Description: INTERVENTIONS:  - Perform BMAT or MOVE assessment daily.   - Set and communicate daily mobility goal to care team and patient/family/caregiver.    - Collaborate with rehabilitation services on mobility goals if consulted  - Perform Range of Motion 3 times a day. - Reposition patient every 2 hours.   - Dangle patient 3 times a day  - Stand patient 3 times a day  - Ambulate patient 3 times a day  - Out of bed to chair 3 times a day   - Out of bed for meals 3 times a day  - Out of bed for toileting  - Record patient progress and toleration of activity level   Outcome: Progressing     Problem: PAIN - ADULT  Goal: Verbalizes/displays adequate comfort level or baseline comfort level  Description: Interventions:  - Encourage patient to monitor pain and request assistance  - Assess pain using appropriate pain scale  - Administer analgesics based on type and severity of pain and evaluate response  - Implement non-pharmacological measures as appropriate and evaluate response  - Consider cultural and social influences on pain and pain management  - Notify physician/advanced practitioner if interventions unsuccessful or patient reports new pain  Outcome: Progressing     Problem: INFECTION - ADULT  Goal: Absence or prevention of progression during hospitalization  Description: INTERVENTIONS:  - Assess and monitor for signs and symptoms of infection  - Monitor lab/diagnostic results  - Monitor all insertion sites, i.e. indwelling lines, tubes, and drains  - Monitor endotracheal if appropriate and nasal secretions for changes in amount and color  - Mary Alice appropriate cooling/warming therapies per order  - Administer medications as ordered  - Instruct and encourage patient and family to use good hand hygiene technique  - Identify and instruct in appropriate isolation precautions for identified infection/condition  Outcome: Progressing  Goal: Absence of fever/infection during neutropenic period  Description: INTERVENTIONS:  - Monitor WBC    Outcome: Progressing     Problem: SAFETY ADULT  Goal: Patient will remain free of falls  Description: INTERVENTIONS:  - Educate patient/family on patient safety including physical limitations  - Instruct patient to call for assistance with activity   - Consult OT/PT to assist with strengthening/mobility   - Keep Call bell within reach  - Keep bed low and locked with side rails adjusted as appropriate  - Keep care items and personal belongings within reach  - Initiate and maintain comfort rounds  - Make Fall Risk Sign visible to staff  - Offer Toileting every 2 Hours, in advance of need  - Initiate/Maintain bed alarm  - Obtain necessary fall risk management equipment  - Apply yellow socks and bracelet for high fall risk patients  - Consider moving patient to room near nurses station  Outcome: Progressing  Goal: Maintain or return to baseline ADL function  Description: INTERVENTIONS:  -  Assess patient's ability to carry out ADLs; assess patient's baseline for ADL function and identify physical deficits which impact ability to perform ADLs (bathing, care of mouth/teeth, toileting, grooming, dressing, etc.)  - Assess/evaluate cause of self-care deficits   - Assess range of motion  - Assess patient's mobility; develop plan if impaired  - Assess patient's need for assistive devices and provide as appropriate  - Encourage maximum independence but intervene and supervise when necessary  - Involve family in performance of ADLs  - Assess for home care needs following discharge   - Consider OT consult to assist with ADL evaluation and planning for discharge  - Provide patient education as appropriate  Outcome: Progressing  Goal: Maintains/Returns to pre admission functional level  Description: INTERVENTIONS:  - Perform BMAT or MOVE assessment daily.   - Set and communicate daily mobility goal to care team and patient/family/caregiver. - Collaborate with rehabilitation services on mobility goals if consulted  - Perform Range of Motion 3 times a day. - Reposition patient every 2 hours.   - Dangle patient 3 times a day  - Stand patient 3 times a day  - Ambulate patient 3 times a day  - Out of bed to chair 3 times a day   - Out of bed for meals 3 times a day  - Out of bed for toileting  - Record patient progress and toleration of activity level   Outcome: Progressing     Problem: DISCHARGE PLANNING  Goal: Discharge to home or other facility with appropriate resources  Description: INTERVENTIONS:  - Identify barriers to discharge w/patient and caregiver  - Arrange for needed discharge resources and transportation as appropriate  - Identify discharge learning needs (meds, wound care, etc.)  - Arrange for interpretive services to assist at discharge as needed  - Refer to Case Management Department for coordinating discharge planning if the patient needs post-hospital services based on physician/advanced practitioner order or complex needs related to functional status, cognitive ability, or social support system  Outcome: Progressing     Problem: Knowledge Deficit  Goal: Patient/family/caregiver demonstrates understanding of disease process, treatment plan, medications, and discharge instructions  Description: Complete learning assessment and assess knowledge base.   Interventions:  - Provide teaching at level of understanding  - Provide teaching via preferred learning methods  Outcome: Progressing     Problem: GASTROINTESTINAL - ADULT  Goal: Maintains adequate nutritional intake  Description: INTERVENTIONS:  - Monitor percentage of each meal consumed  - Identify factors contributing to decreased intake, treat as appropriate  - Assist with meals as needed  - Monitor I&O, weight, and lab values if indicated  - Obtain nutrition services referral as needed  Outcome: Progressing     Problem: METABOLIC, FLUID AND ELECTROLYTES - ADULT  Goal: Electrolytes maintained within normal limits  Description: INTERVENTIONS:  - Monitor labs and assess patient for signs and symptoms of electrolyte imbalances  - Administer electrolyte replacement as ordered  - Monitor response to electrolyte replacements, including repeat lab results as appropriate  - Instruct patient on fluid and nutrition as appropriate  Outcome: Progressing     Problem: SKIN/TISSUE INTEGRITY - ADULT  Goal: Skin Integrity remains intact(Skin Breakdown Prevention)  Description: Assess:  -Perform Patel assessment  -Clean and moisturize skin  -Inspect skin when repositioning, toileting, and assisting with ADLS  -Assess under medical devices  -Assess extremities for adequate circulation and sensation     Bed Management:  -Have minimal linens on bed & keep smooth, unwrinkled  -Change linens as needed when moist or perspiring  -Avoid sitting or lying in one position for more than 2 hours while in bed  -Keep HOB at 30 degrees     Toileting:  -Offer bedside commode  -Assess for incontinence  -Use incontinent care products after each incontinent episode    Activity:  -Mobilize patient 3 times a day  -Encourage activity and walks on unit  -Encourage or provide ROM exercises   -Turn and reposition patient every 2 Hours  -Use appropriate equipment to lift or move patient in bed  -Instruct/ Assist with weight shifting when out of bed in chair  -Consider limitation of chair time 2 hour intervals    Skin Care:  -Avoid use of baby powder, tape, friction and shearing, hot water or constrictive clothing  -Relieve pressure over bony prominences  -Do not massage red bony areas    Next Steps:  -Teach patient strategies to minimize risks   -Consider consults to  interdisciplinary teams  Outcome: Progressing     Problem: HEMATOLOGIC - ADULT  Goal: Maintains hematologic stability  Description: INTERVENTIONS  - Assess for signs and symptoms of bleeding or hemorrhage  - Monitor labs  - Administer supportive blood products/factors as ordered and appropriate  Outcome: Progressing     Problem: COPING  Goal: Pt/Family able to verbalize concerns and demonstrate effective coping strategies  Description: INTERVENTIONS:  - Assist patient/family to identify coping skills, available support systems and cultural and spiritual values  - Provide emotional support, including active listening and acknowledgement of concerns of patient and caregivers  - Reduce environmental stimuli, as able  - Provide patient education  - Assess for spiritual pain/suffering and initiate spiritual care, including notification of Pastoral Care or eda based community as needed  - Assess effectiveness of coping strategies  Outcome: Progressing  Goal: Will report anxiety at manageable levels  Description: INTERVENTIONS:  - Administer medication as ordered  - Teach and encourage coping skills  - Provide emotional support  - Assess patient/family for anxiety and ability to cope  Outcome: Progressing

## 2023-08-28 NOTE — ASSESSMENT & PLAN NOTE
· Patient clinically volume depleted with low-normal blood pressure ranging 116//56  · Will temporarily hold home Norvasc 5 mg daily avoid hypotension

## 2023-08-28 NOTE — H&P
[facility]  H&P  Name: Luna Benson 70 y.o. male I MRN: [id number]  Unit/Bed#: ED-30 I Date of Admission: 8/28/2023   Date of Service: 8/28/2023 I Hospital Day: 0      Assessment/Plan   * Abdominal pain  Assessment & Plan  Patient is a 82-year-old male with past medical history significant for GERD, COPD, hypertension, ITP, and pipe use who presented to the ER for evaluation of abdominal pain, n/v, early satiety, bloating and unintentional 14lb weight loss over past 2 months    · Unfortunately, CT scan in the ER revealed diffuse gastric wall thickening, 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  · Possible metastasis with omental caking, pulmonary nodules, ventral abdominal wall nodule, right adrenal nodule, possible hepatic lesion, and lateral right ninth rib  · will confer with the GI team for tissue diagnosis  · Anticipate upper endoscopy with biopsy:  Pt notes prior EGD >5 yrs ago  · Check CEA  · Consult oncology for outpt follow up    Hypercalcemia  Assessment & Plan  · Patient's corrected calcium is 13.4:  C/w moderate hypercalcemia  · Most likely hypercalcemia of malignancy  · Pt is asymptomatic. Suspect this is not acute hypercalcemia due to malignancy that has progressed  · start IV fluids with isotonic saline and follow up calcium levels:   If not improved will order bisphosphonate  · Check ionized calcium  · EKG wnl  · No evidence of hyperreflexia, or symptoms    Back pain  Assessment & Plan  · Patient with a history of chronic low back pain with previous spinal surgeries  · Patient notes he has chronic numbness of the left lower extremity extending from the knee distally, which his previous specialist attributed to a "bulging disc"  · Has received injections in the past    Chronic ITP (idiopathic thrombocytopenia) ([address])  Assessment & Plan  · Previous records in 2016: Patient has a history of ITP  · Current platelet count normal    Macrocytosis  Assessment & Plan  · Check TSH, B12, folate    Tobacco abuse  Assessment & Plan  Case cessation counseled  Nicotine patch offered    GERD (gastroesophageal reflux disease)  Assessment & Plan  Continue proton pump inhibitor    Hypertension  Assessment & Plan  · Patient clinically volume depleted with low-normal blood pressure ranging 116//56  · Will temporarily hold home Norvasc 5 mg daily avoid hypotension    COPD (chronic obstructive pulmonary disease) ([facility])  Assessment & Plan  · Without acute exacerbation  · Albuterol MDI as needed as of breath            History of Present Illness     HPI:  Fahad Pearce Sr. is a 70 y.o. male who presents with 2-month history of abdominal pain. History is currently taken from patient and his wife at the bedside. Patient notes for the past 2 months has had a poor appetite. He had an unintentional 14 pound weight loss. He noted abdominal bloating and distention. Patient notes that he has been nauseous with eating for 2 months, and only tolerates certain foods. Can no longer eat meat. He denies any heartburn or indigestion. Notes intermittent nausea and vomiting depending on what he eats. Denies any diarrhea or constipation. Denies any melena or hematochezia. Notes he is passing "regular" bowel movements daily. No diarrhea or constipation. Patient notes for the past 2 months he has been having more difficulty breathing when he walks around. Denies any fevers or chills. Denies any chest congestion or phlegm. He notes he does have environmental allergies. Patient denies any cigarette smoking. Notes he smokes a pipe intermittently. Not daily. Drinks alcohol less than once a month. Patient notes he had a previous EGD greater than 5 years ago that was reportedly normal.    Patient notes he has a history of chronic low back pain. He had multiple previous spinal surgeries.   He notes he has chronic low back pain that has been more prominent lately. He notes he has chronic numbness extending distally from his left knee down to his toes. He was told this was secondary to a bulging disc. His previous spine specialist informed him he could receive injections, but no additional surgeries. he notes generalized weakness, and fatigue. He notes the past 2 months when ambulating around his house he would have to stop and rest.  Denies any new focal weakness. Denies any new numbness. Paramjit Tinsley         Historical Information   Past Medical History:   Diagnosis Date   • Acute right flank pain    • Arthritis     generalized   • Asthma     uses inhaler   • Back pain    • Cellulitis    • Chronic ITP (idiopathic thrombocytopenia) ([facility])    • Chronic kidney disease     stones   • COPD (chronic obstructive pulmonary disease) ([facility])    • Full dentures    • GERD (gastroesophageal reflux disease)    • Gout    • Hearing difficulty of both ears    • Hypertension    • ITP (idiopathic thrombocytopenic purpura)    • Lumbar disc herniation    • Renal mass    • Spinal stenosis of lumbar region    • Tobacco abuse    • Wears glasses      Patient Active Problem List   Diagnosis   • COPD (chronic obstructive pulmonary disease) ([facility])   • Hypertension   • GERD (gastroesophageal reflux disease)   • Anxiety   • Tobacco abuse   • Nephrolithiasis   • Renal mass   • Horseshoe kidney   • Abdominal pain   • Hypercalcemia   • Macrocytosis   • Chronic ITP (idiopathic thrombocytopenia) ([facility])   • Back pain     Past Surgical History:   Procedure Laterality Date   • CARPAL TUNNEL RELEASE Bilateral    • CHOLECYSTECTOMY     • FINGER SURGERY Right     index finger   • LUMBAR LAMINECTOMY      spinal stenosis    • CT CYSTO/URETERO W/LITHOTRIPSY &INDWELL STENT INSRT Right 11/9/2016    Procedure: CYSTOSCOPY URETEROSCOPY WITH LITHOTRIPSY, RETROGRADE PYELOGRAM; BASKET STONE EXTRACTION ;  Surgeon: Scott Hemphill MD;  Location: [facility];  Service: Urology   • CT CYSTO/URETERO W/LITHOTRIPSY &INDWELL STENT INSRT Right 9/28/2016    Procedure: CYSTOSCOPY, URETEROSCOPY, DILATION  OF URETERAL STRICTURE, RETROGRADE PYELOGRAM, AND INSERTION STENT URETERAL;  Surgeon: Kera Salgado MD;  Location: [facility];  Service: Urology       Social History   Social History     Substance and Sexual Activity   Alcohol Use No     Social History     Substance and Sexual Activity   Drug Use No     Social History     Tobacco Use   Smoking Status Light Smoker   • Types: Pipe   Smokeless Tobacco Never   Tobacco Comments    pipe once a day       Family History: History reviewed. No pertinent family history. Meds/Allergies     No current facility-administered medications for this encounter. Current Outpatient Medications:   •  acetaminophen (TYLENOL) 325 mg tablet, Take 650 mg by mouth every 6 (six) hours as needed for mild pain, Disp: , Rfl:   •  amLODIPine (NORVASC) 5 mg tablet, Take 5 mg by mouth daily, Disp: , Rfl:   •  celecoxib (CeleBREX) 100 mg capsule, Take 100 mg by mouth daily  , Disp: , Rfl:   •  cyanocobalamin (VITAMIN B-12) 500 mcg tablet, Take 500 mcg by mouth daily. , Disp: , Rfl:   •  famotidine (PEPCID) 20 mg tablet, Take 20 mg by mouth daily at bedtime, Disp: , Rfl:   •  omeprazole (PRILOSEC) 20 mg delayed release capsule, Take 40 mg by mouth daily, Disp: , Rfl:   •  traMADol (ULTRAM) 50 mg tablet, Take 50 mg by mouth Every 6 hours, Disp: , Rfl:   •  allopurinol (ZYLOPRIM) 100 mg tablet, Take 100 mg by mouth in the morning, Disp: , Rfl:   •  budesonide-formoterol (SYMBICORT) 160-4.5 mcg/act inhaler, Inhale 2 puffs as needed  , Disp: , Rfl:   •  Cholecalciferol 50 MCG (2000 UT) CAPS, Take 50 mcg by mouth in the morning, Disp: , Rfl:   •  Colchicine 0.6 MG CAPS, every 24 hours (Patient not taking: Reported on 8/28/2023), Disp: , Rfl:   •  Fish Oil OIL, Take 1,000 g by mouth daily  , Disp: , Rfl:   •  oxybutynin (DITROPAN-XL) 5 mg 24 hr tablet, Take 1 tablet by mouth daily for 30 days (Patient taking differently: Take 5 mg by mouth daily Does not take any longer. LD. 10/29/16 ), Disp: 30 tablet, Rfl: 0  •  tamsulosin (FLOMAX) 0.4 mg, Take 1 capsule by mouth daily with dinner for 30 days, Disp: 30 capsule, Rfl: 0    Allergies   Allergen Reactions   • Codeine GI Intolerance     Nausea and bleeding   • Atorvastatin Hives   • Fenofibrate Hives     Tricor causes muscle pain   • Hydrochlorothiazide Hives   • Prednisone Hives and Other (See Comments)     sensitivity         Review of Systems  A detailed 12 point review of systems was conducted and is negative apart from those mentioned in the HPI. Objective   Vitals: Blood pressure 112/61, pulse 90, temperature 98.4 °F (36.9 °C), temperature source Oral, resp. rate 21, SpO2 95 %. Physical Exam   General: Very pleasant male. No acute distress. Smiling and joking. Wife seated at the bedside, with patient's permission HEENT: EOMI, sclera anicteric, dry mucous membranes, tongue mucosa dry without lesions. No [place] signs  Neck: supple, no JVD,   Heart: Regular rate and rhythm, S1S2 present. No murmur, rub or gallop. Lungs; Clear to auscultation bilaterally. No wheezing, crackles or rhonchi. No accessory muscle use or respiratory distress. Abdomen: soft, +tender mildly, with diffuse palpation, +distended, NABS. No guarding or rebound. No peritoneal sound or mass. Extremities: no clubbing, cyanosis. Trace pretibial and trace pedal edema. .  2+ pedal pulses bilaterally. Full range of motion. Neurologic: Awake and alert. Fluent and goal-directed speech. No confusion or somnolence. Makes eye contact. Follows exams. No tremor. No hyperreflexia. Muscles of facial expression intact. Moving all 4 extremities symmetrically. Subjective numbness with light touch left lower extremity distal to the knee. Skin: warm and dry. No petechiae, purpura or rash.     Lab Results:   Results from last 7 days   Lab Units 08/28/23  1047   WBC Thousand/uL 8.86   HEMOGLOBIN g/dL 13.9   HEMATOCRIT % 42.6   PLATELETS Thousands/uL 196     Results from last 7 days   Lab Units 08/28/23  1047   POTASSIUM mmol/L 4.3   CHLORIDE mmol/L 103   CO2 mmol/L 24   BUN mg/dL 22   CREATININE mg/dL 1.31*   CALCIUM mg/dL 12.6*         Imaging:  CT chest/abdomen/pelvis:1. 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 2.  Cluster of nodules in the right upper lobe are concerning for pulmonary metastasis. 3.  Ventral abdominal wall nodule is new since 2016 and is concerning for metastasis. 4.  A right adrenal adenoma has slightly increased in size since October 10, 2016 and may represent a metastasis. 5.  Subcentimeter sclerotic focus in the lateral right 9th rib is indeterminate for metastasis. 6.  Hepatic cirrhosis. 7.  Possible hepatic lesion. Contrast-enhanced MRI can be considered for further assessment should it guide clinical management. 8.  Moderate volume ascites can be due to the metastatic disease and/or liver disease    EKG: Normal sinus rhythm. No QT prolongation        Code Status: Full code: Confirmed with patient and wife at the bedside      Family: Updated patient and wife at the bedside. Reviewed all test results, and treatment plan. Answered all questions    Communicated consult to GI team    Portions of the record may have been created with voice recognition software.      Disposition: Due to patient's abdominal pain, new diagnosis of gastric malignancy with metastatic disease requiring further work-up, as well as hypercalcemia requiring treatment patient will be admitted to the hospital.  Anticipated length of stay be greater than 2 midnights and will be placed on inpatient status

## 2023-08-29 ENCOUNTER — TELEPHONE (OUTPATIENT)
Dept: HEMATOLOGY ONCOLOGY | Facility: CLINIC | Age: 78
End: 2023-08-29

## 2023-08-29 ENCOUNTER — ANESTHESIA (INPATIENT)
Dept: GASTROENTEROLOGY | Facility: HOSPITAL | Age: 78
DRG: 821 | End: 2023-08-29
Payer: COMMERCIAL

## 2023-08-29 ENCOUNTER — DOCUMENTATION (OUTPATIENT)
Dept: HEMATOLOGY ONCOLOGY | Facility: CLINIC | Age: 78
End: 2023-08-29

## 2023-08-29 ENCOUNTER — APPOINTMENT (INPATIENT)
Dept: GASTROENTEROLOGY | Facility: HOSPITAL | Age: 78
DRG: 821 | End: 2023-08-29
Payer: COMMERCIAL

## 2023-08-29 ENCOUNTER — ANESTHESIA EVENT (INPATIENT)
Dept: GASTROENTEROLOGY | Facility: HOSPITAL | Age: 78
DRG: 821 | End: 2023-08-29
Payer: COMMERCIAL

## 2023-08-29 ENCOUNTER — APPOINTMENT (INPATIENT)
Dept: RADIOLOGY | Facility: HOSPITAL | Age: 78
DRG: 821 | End: 2023-08-29
Payer: COMMERCIAL

## 2023-08-29 PROBLEM — E66.01 MORBIDLY OBESE (HCC): Status: ACTIVE | Noted: 2023-08-29

## 2023-08-29 LAB
ALBUMIN FLD-MCNC: <1.5 G/DL
ALBUMIN SERPL BCP-MCNC: 2.5 G/DL (ref 3.5–5)
ALP SERPL-CCNC: 101 U/L (ref 34–104)
ALT SERPL W P-5'-P-CCNC: 31 U/L (ref 7–52)
ANION GAP SERPL CALCULATED.3IONS-SCNC: 11 MMOL/L
ANION GAP SERPL CALCULATED.3IONS-SCNC: 11 MMOL/L
APPEARANCE FLD: ABNORMAL
AST SERPL W P-5'-P-CCNC: 53 U/L (ref 13–39)
BILIRUB SERPL-MCNC: 1.23 MG/DL (ref 0.2–1)
BUN SERPL-MCNC: 22 MG/DL (ref 5–25)
BUN SERPL-MCNC: 22 MG/DL (ref 5–25)
CA-I BLD-SCNC: 1.43 MMOL/L (ref 1.12–1.32)
CALCIUM ALBUM COR SERPL-MCNC: 12 MG/DL (ref 8.3–10.1)
CALCIUM SERPL-MCNC: 10.8 MG/DL (ref 8.4–10.2)
CALCIUM SERPL-MCNC: 10.8 MG/DL (ref 8.4–10.2)
CEA SERPL-MCNC: 5.5 NG/ML (ref 0–3)
CHLORIDE SERPL-SCNC: 105 MMOL/L (ref 96–108)
CHLORIDE SERPL-SCNC: 105 MMOL/L (ref 96–108)
CO2 SERPL-SCNC: 23 MMOL/L (ref 21–32)
CO2 SERPL-SCNC: 23 MMOL/L (ref 21–32)
COLOR FLD: YELLOW
CREAT SERPL-MCNC: 1.21 MG/DL (ref 0.6–1.3)
CREAT SERPL-MCNC: 1.21 MG/DL (ref 0.6–1.3)
ERYTHROCYTE [DISTWIDTH] IN BLOOD BY AUTOMATED COUNT: 14.8 % (ref 11.6–15.1)
FERRITIN SERPL-MCNC: 69 NG/ML (ref 24–336)
FOLATE SERPL-MCNC: 6.5 NG/ML
GFR SERPL CREATININE-BSD FRML MDRD: 57 ML/MIN/1.73SQ M
GFR SERPL CREATININE-BSD FRML MDRD: 57 ML/MIN/1.73SQ M
GLUCOSE FLD-MCNC: 21 MG/DL
GLUCOSE SERPL-MCNC: 63 MG/DL (ref 65–140)
GLUCOSE SERPL-MCNC: 63 MG/DL (ref 65–140)
GLUCOSE SERPL-MCNC: 71 MG/DL (ref 65–140)
HCT VFR BLD AUTO: 36.2 % (ref 36.5–49.3)
HGB BLD-MCNC: 11.7 G/DL (ref 12–17)
INR PPP: 1.21 (ref 0.84–1.19)
IRON SATN MFR SERPL: 23 % (ref 15–50)
IRON SERPL-MCNC: 60 UG/DL (ref 50–212)
LDH FLD L TO P-CCNC: 2030 U/L
MCH RBC QN AUTO: 32.5 PG (ref 26.8–34.3)
MCHC RBC AUTO-ENTMCNC: 32.3 G/DL (ref 31.4–37.4)
MCV RBC AUTO: 101 FL (ref 82–98)
PLATELET # BLD AUTO: 151 THOUSANDS/UL (ref 149–390)
PMV BLD AUTO: 9.8 FL (ref 8.9–12.7)
POTASSIUM SERPL-SCNC: 4.1 MMOL/L (ref 3.5–5.3)
POTASSIUM SERPL-SCNC: 4.1 MMOL/L (ref 3.5–5.3)
PROT FLD-MCNC: <3 G/DL
PROT SERPL-MCNC: 5.3 G/DL (ref 6.4–8.4)
PROTHROMBIN TIME: 15.3 SECONDS (ref 11.6–14.5)
RBC # BLD AUTO: 3.6 MILLION/UL (ref 3.88–5.62)
SITE: ABNORMAL
SODIUM SERPL-SCNC: 139 MMOL/L (ref 135–147)
SODIUM SERPL-SCNC: 139 MMOL/L (ref 135–147)
TIBC SERPL-MCNC: 265 UG/DL (ref 250–450)
TSH SERPL DL<=0.05 MIU/L-ACNC: 4.17 UIU/ML (ref 0.45–4.5)
UIBC SERPL-MCNC: 205 UG/DL (ref 155–355)
VIT B12 SERPL-MCNC: 1582 PG/ML (ref 180–914)
WBC # BLD AUTO: 6.21 THOUSAND/UL (ref 4.31–10.16)
WBC # FLD MANUAL: 6264 /UL

## 2023-08-29 PROCEDURE — 83550 IRON BINDING TEST: CPT | Performed by: STUDENT IN AN ORGANIZED HEALTH CARE EDUCATION/TRAINING PROGRAM

## 2023-08-29 PROCEDURE — 88305 TISSUE EXAM BY PATHOLOGIST: CPT | Performed by: PATHOLOGY

## 2023-08-29 PROCEDURE — 88341 IMHCHEM/IMCYTCHM EA ADD ANTB: CPT | Performed by: PATHOLOGY

## 2023-08-29 PROCEDURE — 82042 OTHER SOURCE ALBUMIN QUAN EA: CPT | Performed by: PHYSICIAN ASSISTANT

## 2023-08-29 PROCEDURE — 83540 ASSAY OF IRON: CPT | Performed by: STUDENT IN AN ORGANIZED HEALTH CARE EDUCATION/TRAINING PROGRAM

## 2023-08-29 PROCEDURE — 10005 FNA BX W/US GDN 1ST LES: CPT

## 2023-08-29 PROCEDURE — 85610 PROTHROMBIN TIME: CPT | Performed by: NURSE PRACTITIONER

## 2023-08-29 PROCEDURE — 88377 M/PHMTRC ALYS ISHQUANT/SEMIQ: CPT | Performed by: PATHOLOGY

## 2023-08-29 PROCEDURE — 86301 IMMUNOASSAY TUMOR CA 19-9: CPT | Performed by: NURSE PRACTITIONER

## 2023-08-29 PROCEDURE — 88342 IMHCHEM/IMCYTCHM 1ST ANTB: CPT | Performed by: PATHOLOGY

## 2023-08-29 PROCEDURE — 84157 ASSAY OF PROTEIN OTHER: CPT | Performed by: PHYSICIAN ASSISTANT

## 2023-08-29 PROCEDURE — 82330 ASSAY OF CALCIUM: CPT | Performed by: INTERNAL MEDICINE

## 2023-08-29 PROCEDURE — 80053 COMPREHEN METABOLIC PANEL: CPT | Performed by: PHYSICIAN ASSISTANT

## 2023-08-29 PROCEDURE — 88112 CYTOPATH CELL ENHANCE TECH: CPT | Performed by: PATHOLOGY

## 2023-08-29 PROCEDURE — 88364 INSITU HYBRIDIZATION (FISH): CPT | Performed by: PATHOLOGY

## 2023-08-29 PROCEDURE — 80048 BASIC METABOLIC PNL TOTAL CA: CPT | Performed by: INTERNAL MEDICINE

## 2023-08-29 PROCEDURE — 0WBF3ZX EXCISION OF ABDOMINAL WALL, PERCUTANEOUS APPROACH, DIAGNOSTIC: ICD-10-PCS | Performed by: RADIOLOGY

## 2023-08-29 PROCEDURE — 0W9G3ZX DRAINAGE OF PERITONEAL CAVITY, PERCUTANEOUS APPROACH, DIAGNOSTIC: ICD-10-PCS | Performed by: FAMILY MEDICINE

## 2023-08-29 PROCEDURE — 82948 REAGENT STRIP/BLOOD GLUCOSE: CPT

## 2023-08-29 PROCEDURE — 49083 ABD PARACENTESIS W/IMAGING: CPT | Performed by: NURSE PRACTITIONER

## 2023-08-29 PROCEDURE — 88368 INSITU HYBRIDIZATION MANUAL: CPT | Performed by: PATHOLOGY

## 2023-08-29 PROCEDURE — 82945 GLUCOSE OTHER FLUID: CPT | Performed by: PHYSICIAN ASSISTANT

## 2023-08-29 PROCEDURE — 97162 PT EVAL MOD COMPLEX 30 MIN: CPT

## 2023-08-29 PROCEDURE — 87070 CULTURE OTHR SPECIMN AEROBIC: CPT | Performed by: PHYSICIAN ASSISTANT

## 2023-08-29 PROCEDURE — 88313 SPECIAL STAINS GROUP 2: CPT | Performed by: PATHOLOGY

## 2023-08-29 PROCEDURE — 88307 TISSUE EXAM BY PATHOLOGIST: CPT | Performed by: PATHOLOGY

## 2023-08-29 PROCEDURE — 82728 ASSAY OF FERRITIN: CPT | Performed by: STUDENT IN AN ORGANIZED HEALTH CARE EDUCATION/TRAINING PROGRAM

## 2023-08-29 PROCEDURE — 88365 INSITU HYBRIDIZATION (FISH): CPT | Performed by: PATHOLOGY

## 2023-08-29 PROCEDURE — 99232 SBSQ HOSP IP/OBS MODERATE 35: CPT | Performed by: NURSE PRACTITIONER

## 2023-08-29 PROCEDURE — NC001 PR NO CHARGE: Performed by: NURSE PRACTITIONER

## 2023-08-29 PROCEDURE — 88360 TUMOR IMMUNOHISTOCHEM/MANUAL: CPT | Performed by: PATHOLOGY

## 2023-08-29 PROCEDURE — 20206 BIOPSY MUSCLE PERQ NEEDLE: CPT | Performed by: NURSE PRACTITIONER

## 2023-08-29 PROCEDURE — 87205 SMEAR GRAM STAIN: CPT | Performed by: PHYSICIAN ASSISTANT

## 2023-08-29 PROCEDURE — 82746 ASSAY OF FOLIC ACID SERUM: CPT | Performed by: INTERNAL MEDICINE

## 2023-08-29 PROCEDURE — 82378 CARCINOEMBRYONIC ANTIGEN: CPT | Performed by: NURSE PRACTITIONER

## 2023-08-29 PROCEDURE — 84443 ASSAY THYROID STIM HORMONE: CPT | Performed by: INTERNAL MEDICINE

## 2023-08-29 PROCEDURE — 49083 ABD PARACENTESIS W/IMAGING: CPT

## 2023-08-29 PROCEDURE — 76942 ECHO GUIDE FOR BIOPSY: CPT | Performed by: NURSE PRACTITIONER

## 2023-08-29 PROCEDURE — 82607 VITAMIN B-12: CPT | Performed by: INTERNAL MEDICINE

## 2023-08-29 PROCEDURE — 83615 LACTATE (LD) (LDH) ENZYME: CPT | Performed by: PHYSICIAN ASSISTANT

## 2023-08-29 PROCEDURE — 89051 BODY FLUID CELL COUNT: CPT | Performed by: PHYSICIAN ASSISTANT

## 2023-08-29 PROCEDURE — 85027 COMPLETE CBC AUTOMATED: CPT | Performed by: INTERNAL MEDICINE

## 2023-08-29 PROCEDURE — 97166 OT EVAL MOD COMPLEX 45 MIN: CPT

## 2023-08-29 RX ORDER — PANTOPRAZOLE SODIUM 40 MG/1
40 TABLET, DELAYED RELEASE ORAL
Status: DISCONTINUED | OUTPATIENT
Start: 2023-08-29 | End: 2023-08-31 | Stop reason: HOSPADM

## 2023-08-29 RX ORDER — SODIUM CHLORIDE 9 MG/ML
INJECTION, SOLUTION INTRAVENOUS CONTINUOUS PRN
Status: DISCONTINUED | OUTPATIENT
Start: 2023-08-29 | End: 2023-08-29

## 2023-08-29 RX ORDER — LIDOCAINE HYDROCHLORIDE 20 MG/ML
INJECTION, SOLUTION EPIDURAL; INFILTRATION; INTRACAUDAL; PERINEURAL AS NEEDED
Status: DISCONTINUED | OUTPATIENT
Start: 2023-08-29 | End: 2023-08-29

## 2023-08-29 RX ORDER — LIDOCAINE WITH 8.4% SOD BICARB 0.9%(10ML)
SYRINGE (ML) INJECTION AS NEEDED
Status: COMPLETED | OUTPATIENT
Start: 2023-08-29 | End: 2023-08-29

## 2023-08-29 RX ORDER — PROPOFOL 10 MG/ML
INJECTION, EMULSION INTRAVENOUS AS NEEDED
Status: DISCONTINUED | OUTPATIENT
Start: 2023-08-29 | End: 2023-08-29

## 2023-08-29 RX ORDER — SODIUM CHLORIDE 9 MG/ML
100 INJECTION, SOLUTION INTRAVENOUS CONTINUOUS
Status: DISCONTINUED | OUTPATIENT
Start: 2023-08-29 | End: 2023-08-31 | Stop reason: HOSPADM

## 2023-08-29 RX ORDER — DEXTROSE AND SODIUM CHLORIDE 5; .9 G/100ML; G/100ML
100 INJECTION, SOLUTION INTRAVENOUS CONTINUOUS
Status: DISCONTINUED | OUTPATIENT
Start: 2023-08-29 | End: 2023-08-29

## 2023-08-29 RX ADMIN — BUDESONIDE AND FORMOTEROL FUMARATE DIHYDRATE 2 PUFF: 160; 4.5 AEROSOL RESPIRATORY (INHALATION) at 08:25

## 2023-08-29 RX ADMIN — Medication 10 ML: at 15:00

## 2023-08-29 RX ADMIN — PROPOFOL 50 MG: 10 INJECTION, EMULSION INTRAVENOUS at 09:45

## 2023-08-29 RX ADMIN — SODIUM CHLORIDE: 0.9 INJECTION, SOLUTION INTRAVENOUS at 09:25

## 2023-08-29 RX ADMIN — SODIUM CHLORIDE 100 ML/HR: 0.9 INJECTION, SOLUTION INTRAVENOUS at 01:32

## 2023-08-29 RX ADMIN — PANTOPRAZOLE SODIUM 40 MG: 40 TABLET, DELAYED RELEASE ORAL at 17:18

## 2023-08-29 RX ADMIN — DEXTROSE AND SODIUM CHLORIDE 100 ML/HR: 5; .9 INJECTION, SOLUTION INTRAVENOUS at 08:28

## 2023-08-29 RX ADMIN — Medication 10 ML: at 14:28

## 2023-08-29 RX ADMIN — HEPARIN SODIUM 5000 UNITS: 5000 INJECTION INTRAVENOUS; SUBCUTANEOUS at 05:47

## 2023-08-29 RX ADMIN — PROPOFOL 50 MG: 10 INJECTION, EMULSION INTRAVENOUS at 09:43

## 2023-08-29 RX ADMIN — SODIUM CHLORIDE 100 ML/HR: 0.9 INJECTION, SOLUTION INTRAVENOUS at 12:48

## 2023-08-29 RX ADMIN — PROPOFOL 50 MG: 10 INJECTION, EMULSION INTRAVENOUS at 09:39

## 2023-08-29 RX ADMIN — Medication 10 ML: at 14:11

## 2023-08-29 RX ADMIN — PROPOFOL 50 MG: 10 INJECTION, EMULSION INTRAVENOUS at 09:47

## 2023-08-29 RX ADMIN — LIDOCAINE HYDROCHLORIDE 100 MG: 20 INJECTION, SOLUTION EPIDURAL; INFILTRATION; INTRACAUDAL at 09:37

## 2023-08-29 RX ADMIN — PANTOPRAZOLE SODIUM 40 MG: 40 TABLET, DELAYED RELEASE ORAL at 05:48

## 2023-08-29 RX ADMIN — CYANOCOBALAMIN TAB 500 MCG 500 MCG: 500 TAB at 08:26

## 2023-08-29 RX ADMIN — PROPOFOL 100 MG: 10 INJECTION, EMULSION INTRAVENOUS at 09:37

## 2023-08-29 RX ADMIN — ALLOPURINOL 100 MG: 100 TABLET ORAL at 08:26

## 2023-08-29 RX ADMIN — BUDESONIDE AND FORMOTEROL FUMARATE DIHYDRATE 2 PUFF: 160; 4.5 AEROSOL RESPIRATORY (INHALATION) at 21:51

## 2023-08-29 RX ADMIN — HEPARIN SODIUM 5000 UNITS: 5000 INJECTION INTRAVENOUS; SUBCUTANEOUS at 17:49

## 2023-08-29 RX ADMIN — ZOLEDRONIC ACID 4 MG: 0.8 INJECTION, SOLUTION, CONCENTRATE INTRAVENOUS at 15:41

## 2023-08-29 RX ADMIN — FAMOTIDINE 20 MG: 20 TABLET ORAL at 21:51

## 2023-08-29 RX ADMIN — PROPOFOL 50 MG: 10 INJECTION, EMULSION INTRAVENOUS at 09:41

## 2023-08-29 NOTE — BRIEF OP NOTE (RAD/CATH)
IR BIOPSY SOFT TISSUE/SUPERFICIAL  IR PARACENTESIS Procedure Note    PATIENT NAME: Jase Monaco Sr.  : 1945  MRN: [id number]    Pre-op Diagnosis:   1. Ascites    2. Abnormal CT of the abdomen    3. Metastatic cancer ([address])    4. Hepatic lesion      Post-op Diagnosis:   1. Ascites    2. Abnormal CT of the abdomen    3. Metastatic cancer ([address])    4.  Hepatic lesion        Provider:   Marzena Mcintosh  Assistants:     No qualified resident was available, Resident is only observing    Estimated Blood Loss: minimal     Findings: 4400 mL serous ascites, RLQ  4 core specimens removed from right upper abdominal mass    Specimens: ascites sent for labs as ordered, tissue biopsy sent to pathology    Complications:  None immediate     Anesthesia: local    Marzena Junior     Date: 2023  Time: 3:20 PM

## 2023-08-29 NOTE — PROGRESS NOTES
Intake received and chart reviewed for pathway workup evaluation. Patient presented to the ED on 8/28 for evaluation of abdominal pain, n/v, early satiety, bloating and unintentional 14lb weight loss over past 2 months. Patient is currently admitted at [facility]. EGD date-  8/29      • Impression-  •  Grade B esophagitis  • Malignant-appearing ulcer in the body of the stomach and greater curve of the stomach with flat pigmented spot ([facility]athon I[facility]); performed cold forceps biopsy  • Single malignant-appearing ulcer in the body of the stomach and lesser curve of the stomach with clean base (Jonathon III); performed cold forceps biopsy  • Malignant-appearing ulcer in the fundus of the stomach with clean base (Jonathon III); performed cold forceps biopsy  • Edematous and erythematous mucosa with erosion in the body of the stomach, incisura and antrum  • Performed forceps biopsies in the incisura and antrum to rule out H. pylori  The duodenum appeared normal.      Pathology- Gastric - pending           Soft tissue (abdominal wall) - pending       Imaging-  CT CHEST, ABDOMEN AND PELVIS WITH IV CONTRAST     INDICATION:   abdominal pain and bloating/ dyspnea on exertion. COMPARISON: CT abdomen pelvis October 10, 2016. No prior CT chest available for comparison     TECHNIQUE: CT examination of the chest, abdomen and pelvis was performed. Multiplanar 2D reformatted images were created from the source data. This examination, like all CT scans performed in the [facility], was performed utilizing techniques to minimize radiation dose exposure, including the use of iterative reconstruction and automated exposure control. Radiation dose length   product (DLP) for this visit:  2374 mGy-cm     IV Contrast:  100 mL of iohexol (OMNIPAQUE)  Enteric Contrast: Enteric contrast was not administered. FINDINGS:     CHEST     LUNGS: Mild emphysema.      Cluster of masses/nodules in the right upper lobe broadly abuts the pleura and measures up to 4.3 x 3.2 cm (series 6, image 31). PLEURA:  Unremarkable. HEART/GREAT VESSELS: Heart is unremarkable for patient's age. No thoracic aortic aneurysm. MEDIASTINUM AND DEBRA: Anterior mediastinal nodule measures 1.4 x 1.5 cm (series 2, image 44). CHEST WALL AND LOWER NECK:  Unremarkable. ABDOMEN     LIVER/BILIARY TREE: Hepatic cirrhosis. Possibly contour deforming lesion along the posterior aspect of hepatic segment 6 measures 1.9 x 1.6 cm (series 2, image 129). 1.6 cm low-attenuation lesion (Hounsfield units of 10) in the right hepatic lobe is similar to 2016 and is most compatible a cyst (series 2, image 108). Patent hepatic and portal veins. No biliary ductal dilation. GALLBLADDER: Post cholecystectomy     SPLEEN:  Unremarkable. PANCREAS:  Unremarkable. ADRENAL GLANDS: Right adrenal lesion measures a 1.1 x 0.9 cm, previously 1.0 x 0.6 cm on October 10, 2016 whereupon it had average Hounsfield units of 3 on the unenhanced portion of the study (series 2, image 112). No left adrenal lesion. KIDNEYS/URETERS: Horseshoe kidney. No hydroureteronephrosis. STOMACH AND BOWEL: Multiple areas of mural thickening and heterogeneous attenuation throughout the stomach. No bowel obstruction. Colonic diverticulosis. APPENDIX:  No findings to suggest appendicitis. ABDOMINOPELVIC CAVITY: Omental mass centered in the left upper quadrant surrounding the greater and lesser curvatures of the stomach measures approximately 8.9 x 5.2 cm laterally where it abuts the spleen and surrounds the arteries (series 2, image 116),   and 9.8 x 4.0 cm anteriorly (series 2, image 125). Multiple similar masses/nodes along the gastrohepatic ligament measure up to 5.1 x 2.0 cm (series 2, image 101). Similar-appearing mesenteric nodule or mass to the right of midline measures 2.4 x 1.8 cm   (series 2, image 132).  The left sided nodularity abuts the proximal descending colon (for example series 601, image 101). Moderate volume ascites. VESSELS:  Unremarkable for patient's age. PELVIS     REPRODUCTIVE ORGANS:  Unremarkable for patient's age. URINARY BLADDER:  Unremarkable. ABDOMINAL WALL/INGUINAL REGIONS: New nodule in the right ventral lateral abdominal wall measures 1.6 x 1.6 cm (series 2, image 128). OSSEOUS STRUCTURES: 0.2 cm sclerosis in the lateral right 9th rib is new since October 10, 2016 (series 2, image 107). Multilevel degenerative change of the spine. IMPRESSION:     1. 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 2.  Cluster of nodules in the right upper lobe are concerning for pulmonary metastasis. 3.  Ventral abdominal wall nodule is new since 2016 and is concerning for metastasis. 4.  A right adrenal adenoma has slightly increased in size since October 10, 2016 and may represent a metastasis. 5.  Subcentimeter sclerotic focus in the lateral right 9th rib is indeterminate for metastasis. 6.  Hepatic cirrhosis. 7.  Possible hepatic lesion. Contrast-enhanced MRI can be considered for further assessment should it guide clinical management. 8.  Moderate volume ascites can be due to the metastatic disease and/or liver disease.         Scheduled appointments-      9/11 Medical Oncology with Dr. Julián Thao

## 2023-08-29 NOTE — PLAN OF CARE
Problem: OCCUPATIONAL THERAPY ADULT  Goal: Performs self-care activities at highest level of function for planned discharge setting. See evaluation for individualized goals. Description: Treatment Interventions: ADL retraining, Functional transfer training, UE strengthening/ROM, Endurance training, Patient/family training, Neuromuscular reeducation, Compensatory technique education, Energy conservation, Activityengagement          See flowsheet documentation for full assessment, interventions and recommendations. Note: Limitation: Decreased ADL status, Decreased UE strength, Decreased cognition, Decreased self-care trans  Prognosis: Good  Assessment: Michelle Díaz Sr. is a 70 y.o. male seen for OT evaluation s/p admit to [facility] on 8/28/2023 w/ Abdominal pain. Comorbidities affecting pt's functional performance at time of assessment include: GERD, COPD, hypertension, ITP, and pipe use. Pt with active OT orders and activity orders for Activity as tolerated. Personal factors affecting pt at time of IE include:SUNDAY home environment, steps within home environment, difficulty performing transfers/mobility, fall risk  and functional decline . Prior to admission, pt lives with wife in a 2 level home with 0 SUNDAY through garage and 1 FOS to 2nd floor bed/ bath, also has a full bath on the first floor and is able to stay on 1st floor if needed. Has a tub and walk in shower, standard toilets. Pt was I with ADLS, IADLs and mobility with no device. Owns canes and RWs. Drives. Wife works. Reports 0 falls in the past 6 months. Reports he is looking into getting a stair glide to the 2nd floor since he currently crawls up the stairs.   Upon evaluation: Pt currently requires supervision for UB ADLs, supervision for LB ADLs, supervision for toileting, supervision for bed mobility, supervision for functional transfers, and supervision mobility 2* the following deficits impacting occupational performance:weakness, decreased strength , decreased balance, decreased activity tolerance, decreased safety awareness and increased pain. Pt to benefit from continued skilled OT tx while in the hospital to address deficits as defined above and maximize level of functional independence w ADL's and functional mobility. Occupational Performance areas to address include: grooming, bathing/shower, toilet hygiene, dressing, functional mobility, community mobility and clothing management. From OT standpoint, recommendation at time of d/c would be home with [address] [place] [address] to follow pt on caseload 2-3x/wk.      OT Discharge Recommendation: Home with home health rehabilitation

## 2023-08-29 NOTE — ANESTHESIA PREPROCEDURE EVALUATION
Procedure:  EGD    Relevant Problems   CARDIO   (+) Hypertension      GI/HEPATIC   (+) GERD (gastroesophageal reflux disease)      /RENAL   (+) Horseshoe kidney   (+) Nephrolithiasis      HEMATOLOGY   (+) Chronic ITP (idiopathic thrombocytopenia) (HCC)      MUSCULOSKELETAL   (+) Back pain      NEURO/PSYCH   (+) Anxiety      PULMONARY   (+) COPD (chronic obstructive pulmonary disease) (HCC)      Other   (+) Morbidly obese ([facility])   (+) Tobacco abuse        Physical Exam    Airway    Mallampati score: II  TM Distance: >3 FB  Neck ROM: full     Dental   upper dentures and lower dentures,     Cardiovascular  Rhythm: regular, Rate: normal, Cardiovascular exam normal    Pulmonary  Pulmonary exam normal Breath sounds clear to auscultation,     Other Findings        Anesthesia Plan  ASA Score- 3     Anesthesia Type- general with ASA Monitors. Additional Monitors:   Airway Plan:           Plan Factors-Exercise tolerance (METS): <4 METS. Chart reviewed. Existing labs reviewed. Patient is a current smoker. Obstructive sleep apnea risk education given perioperatively. Induction- intravenous. Postoperative Plan-     Informed Consent- Anesthetic plan and risks discussed with patient.

## 2023-08-29 NOTE — ANESTHESIA POSTPROCEDURE EVALUATION
Post-Op Assessment Note    CV Status:  Stable  Pain Score: 0    Pain management: adequate     Mental Status:  Alert and awake   Hydration Status:  Euvolemic and stable   PONV Controlled:  Controlled   Airway Patency:  Patent      Post Op Vitals Reviewed: Yes      Staff: Anesthesiologist         No notable events documented.     /72 (08/29/23 1053)    Temp 97.8 °F (36.6 °C) (08/29/23 1053)    Pulse 90 (08/29/23 1053)   Resp 19 (08/29/23 1053)    SpO2 95 % (08/29/23 1053)

## 2023-08-29 NOTE — TELEPHONE ENCOUNTER
Soft Intake Form   Patient Details   Michelle Díaz Sr.     1945     Reason For Appointment   Who is Calling? [facility]   If not patient, Name? NA   DID YOU CONFIRM INSURANCE WITH PATIENT? E verified, Routed to finance   Who is the Referring Doctor? Dr. Merlyn Velazco   What is the diagnosis? Abnormal Imaging (gastric wall thickening with extensive surrounding omental caking), Hypercalcemia   Has this diagnosis been confirmed by a biopsy/surgery? If yes, what is the date it was done? Stomach bx taken on 8/29   Biopsy done at [facility]? If not, where was it done? Yes   Was imaging done, and was it done at [facility]? If not, where was it done? Yes-[facility]   Have you been seen by another Oncologist?  If so, who and where (name of facility, city and state) No   For 2nd Opinions Only: Are you currently undergoing treatment, or are you scheduled to start treatment? If yes, name of facility, city and state  NA   For "History Of" only: Have you completed treatment? NA   Have you had Genetic Testing done in the past?  If so, advise to bring test results to their visit NA   Record Gathering Information   Did you advise to have records faxed to [phone]? NA   Did you advise to have disks sent to the proper address with imaging? ("History of" Patients)  5 years of imaging for breast patients-Mammos, US etc NA   Scheduling Information   Did you send new patient paperwork? Email or mail? NA   What is the best call back number? (If the [facility] is calling, please use their number) NA   Miscellaneous Information      The patient is scheduled for his HFU appointment with Dr Merlyn Velazco on 9/11 at [address] [facility] in the [facility].

## 2023-08-29 NOTE — DISCHARGE INSTRUCTIONS
POST BIOPSY    Care after your procedure:    1. Limit your activities for 24 hours after your biopsy. 2. No driving day of biopsy. 3. Return to your normal diet. Small sips of flat soda will help with mild nausea. 4. Remove band-aid or dressing 24 hours after procedure. Contact Interventional Radiology at [phone] [facility] PATIENTS: Contact Interventional Radiology at [phone]) [facility] PATIENTS: Contact Interventional Radiology at [phone]) if:    1. Difficulty breathing, nausea or vomiting. 2. Chills or fever above 101 degrees F.     3. Pain at biopsy site not relieved by medication. 4. Develop any redness, swelling, heat, unusual drainage, heavy bruising or bleeding from biopsy site. Abdominal Paracentesis     WHAT YOU NEED TO KNOW:   Abdominal paracentesis is a procedure to remove abnormal fluid buildup in your abdomen. Fluid builds up because of liver problems, such as swelling and scarring. Heart failure, kidney disease, a mass, or problems with your pancreas may also cause fluid buildup. DISCHARGE INSTRUCTIONS:     Follow up with your healthcare provider as directed: Write down your questions so you remember to ask them during your visits. Wound care: Remove dressing after 24 hours. Leave glue in place. Return to your normal activities    Contact Interventional Radiology at [phone] [facility] PATIENTS: Contact Interventional Radiology at [phone]) [facility] PATIENTS: Contact Interventional Radiology at [phone]) if:  You have a fever and your wound is red and swollen. You have yellow, green, or bad-smelling discharge coming from your wound. You have pain or swelling in your abdomen. You have an upset stomach or you vomit. You have sudden, sharp pain in your abdomen. You urinate very little or not at all. You feel confused and more tired than usual.   Your arm or leg feels warm, tender, and painful. It may look swollen and red.    You suddenly feel lightheaded and have trouble breathing.

## 2023-08-29 NOTE — ASSESSMENT & PLAN NOTE
Corrected calcium on admission 13.4  · Most likely hypercalcemia of malignancy  · Without hyperreflexia on exam  · Initiate IV fluids yesterday  · Corrected calcium improved to 12.0 today  · We will administer single dose of Zometa   · CMP in a.m.

## 2023-08-29 NOTE — PLAN OF CARE
Problem: Potential for Falls  Goal: Patient will remain free of falls  Description: INTERVENTIONS:  - Educate patient/family on patient safety including physical limitations  - Instruct patient to call for assistance with activity   - Consult OT/PT to assist with strengthening/mobility   - Keep Call bell within reach  - Keep bed low and locked with side rails adjusted as appropriate  - Keep care items and personal belongings within reach  - Initiate and maintain comfort rounds  - Make Fall Risk Sign visible to staff  - Offer Toileting every 2 Hours, in advance of need  - Initiate/Maintain bed alarm  - Obtain necessary fall risk management equipment  - Apply yellow socks and bracelet for high fall risk patients  - Consider moving patient to room near nurses station  Outcome: Progressing     Problem: MOBILITY - ADULT  Goal: Maintain or return to baseline ADL function  Description: INTERVENTIONS:  -  Assess patient's ability to carry out ADLs; assess patient's baseline for ADL function and identify physical deficits which impact ability to perform ADLs (bathing, care of mouth/teeth, toileting, grooming, dressing, etc.)  - Assess/evaluate cause of self-care deficits   - Assess range of motion  - Assess patient's mobility; develop plan if impaired  - Assess patient's need for assistive devices and provide as appropriate  - Encourage maximum independence but intervene and supervise when necessary  - Involve family in performance of ADLs  - Assess for home care needs following discharge   - Consider OT consult to assist with ADL evaluation and planning for discharge  - Provide patient education as appropriate  Outcome: Progressing  Goal: Maintains/Returns to pre admission functional level  Description: INTERVENTIONS:  - Perform BMAT or MOVE assessment daily.   - Set and communicate daily mobility goal to care team and patient/family/caregiver.    - Collaborate with rehabilitation services on mobility goals if consulted  - Perform Range of Motion 3 times a day. - Reposition patient every 2 hours.   - Dangle patient 3 times a day  - Stand patient 3 times a day  - Ambulate patient 3 times a day  - Out of bed to chair 3 times a day   - Out of bed for meals 3 times a day  - Out of bed for toileting  - Record patient progress and toleration of activity level   Outcome: Progressing     Problem: PAIN - ADULT  Goal: Verbalizes/displays adequate comfort level or baseline comfort level  Description: Interventions:  - Encourage patient to monitor pain and request assistance  - Assess pain using appropriate pain scale  - Administer analgesics based on type and severity of pain and evaluate response  - Implement non-pharmacological measures as appropriate and evaluate response  - Consider cultural and social influences on pain and pain management  - Notify physician/advanced practitioner if interventions unsuccessful or patient reports new pain  Outcome: Progressing     Problem: INFECTION - ADULT  Goal: Absence or prevention of progression during hospitalization  Description: INTERVENTIONS:  - Assess and monitor for signs and symptoms of infection  - Monitor lab/diagnostic results  - Monitor all insertion sites, i.e. indwelling lines, tubes, and drains  - Monitor endotracheal if appropriate and nasal secretions for changes in amount and color  - [place] appropriate cooling/warming therapies per order  - Administer medications as ordered  - Instruct and encourage patient and family to use good hand hygiene technique  - Identify and instruct in appropriate isolation precautions for identified infection/condition  Outcome: Progressing  Goal: Absence of fever/infection during neutropenic period  Description: INTERVENTIONS:  - Monitor WBC    Outcome: Progressing     Problem: SAFETY ADULT  Goal: Patient will remain free of falls  Description: INTERVENTIONS:  - Educate patient/family on patient safety including physical limitations  - Instruct patient to call for assistance with activity   - Consult OT/PT to assist with strengthening/mobility   - Keep Call bell within reach  - Keep bed low and locked with side rails adjusted as appropriate  - Keep care items and personal belongings within reach  - Initiate and maintain comfort rounds  - Make Fall Risk Sign visible to staff  - Offer Toileting every 2 Hours, in advance of need  - Initiate/Maintain bed alarm  - Obtain necessary fall risk management equipment  - Apply yellow socks and bracelet for high fall risk patients  - Consider moving patient to room near nurses station  Outcome: Progressing  Goal: Maintain or return to baseline ADL function  Description: INTERVENTIONS:  -  Assess patient's ability to carry out ADLs; assess patient's baseline for ADL function and identify physical deficits which impact ability to perform ADLs (bathing, care of mouth/teeth, toileting, grooming, dressing, etc.)  - Assess/evaluate cause of self-care deficits   - Assess range of motion  - Assess patient's mobility; develop plan if impaired  - Assess patient's need for assistive devices and provide as appropriate  - Encourage maximum independence but intervene and supervise when necessary  - Involve family in performance of ADLs  - Assess for home care needs following discharge   - Consider OT consult to assist with ADL evaluation and planning for discharge  - Provide patient education as appropriate  Outcome: Progressing  Goal: Maintains/Returns to pre admission functional level  Description: INTERVENTIONS:  - Perform BMAT or MOVE assessment daily.   - Set and communicate daily mobility goal to care team and patient/family/caregiver. - Collaborate with rehabilitation services on mobility goals if consulted  - Perform Range of Motion 3 times a day. - Reposition patient every 2 hours.   - Dangle patient 3 times a day  - Stand patient 3 times a day  - Ambulate patient 3 times a day  - Out of bed to chair 3 times a day   - Out of bed for meals 3 times a day  - Out of bed for toileting  - Record patient progress and toleration of activity level   Outcome: Progressing     Problem: DISCHARGE PLANNING  Goal: Discharge to home or other facility with appropriate resources  Description: INTERVENTIONS:  - Identify barriers to discharge w/patient and caregiver  - Arrange for needed discharge resources and transportation as appropriate  - Identify discharge learning needs (meds, wound care, etc.)  - Arrange for interpretive services to assist at discharge as needed  - Refer to Case Management Department for coordinating discharge planning if the patient needs post-hospital services based on physician/advanced practitioner order or complex needs related to functional status, cognitive ability, or social support system  Outcome: Progressing     Problem: Knowledge Deficit  Goal: Patient/family/caregiver demonstrates understanding of disease process, treatment plan, medications, and discharge instructions  Description: Complete learning assessment and assess knowledge base.   Interventions:  - Provide teaching at level of understanding  - Provide teaching via preferred learning methods  Outcome: Progressing     Problem: GASTROINTESTINAL - ADULT  Goal: Maintains adequate nutritional intake  Description: INTERVENTIONS:  - Monitor percentage of each meal consumed  - Identify factors contributing to decreased intake, treat as appropriate  - Assist with meals as needed  - Monitor I&O, weight, and lab values if indicated  - Obtain nutrition services referral as needed  Outcome: Progressing     Problem: METABOLIC, FLUID AND ELECTROLYTES - ADULT  Goal: Electrolytes maintained within normal limits  Description: INTERVENTIONS:  - Monitor labs and assess patient for signs and symptoms of electrolyte imbalances  - Administer electrolyte replacement as ordered  - Monitor response to electrolyte replacements, including repeat lab results as appropriate  - Instruct patient on fluid and nutrition as appropriate  Outcome: Progressing     Problem: SKIN/TISSUE INTEGRITY - ADULT  Goal: Skin Integrity remains intact(Skin Breakdown Prevention)  Description: Assess:  -Perform Patel assessment  -Clean and moisturize skin   -Inspect skin when repositioning, toileting, and assisting with ADLS  -Assess under medical devices  -Assess extremities for adequate circulation and sensation     Bed Management:  -Have minimal linens on bed & keep smooth, unwrinkled  -Change linens as needed when moist or perspiring  -Avoid sitting or lying in one position for more than 2 hours while in bed  -Keep HOB at 30 degrees     Toileting:  -Offer bedside commode  -Assess for incontinence  -Use incontinent care products after each incontinent episode    Activity:  -Mobilize patient 3 times a day  -Encourage activity and walks on unit  -Encourage or provide ROM exercises   -Turn and reposition patient every 2 Hours  -Use appropriate equipment to lift or move patient in bed  -Instruct/ Assist with weight shifting every when out of bed in chair  -Consider limitation of chair time 2 hour intervals    Skin Care:  -Avoid use of baby powder, tape, friction and shearing, hot water or constrictive clothing  -Relieve pressure over bony prominences  -Do not massage red bony areas    Next Steps:  -Teach patient strategies to minimize risks   -Consider consults to  interdisciplinary teams  Outcome: Progressing     Problem: HEMATOLOGIC - ADULT  Goal: Maintains hematologic stability  Description: INTERVENTIONS  - Assess for signs and symptoms of bleeding or hemorrhage  - Monitor labs  - Administer supportive blood products/factors as ordered and appropriate  Outcome: Progressing     Problem: COPING  Goal: Pt/Family able to verbalize concerns and demonstrate effective coping strategies  Description: INTERVENTIONS:  - Assist patient/family to identify coping skills, available support systems and cultural and spiritual values  - Provide emotional support, including active listening and acknowledgement of concerns of patient and caregivers  - Reduce environmental stimuli, as able  - Provide patient education  - Assess for spiritual pain/suffering and initiate spiritual care, including notification of Pastoral Care or eda based community as needed  - Assess effectiveness of coping strategies  Outcome: Progressing  Goal: Will report anxiety at manageable levels  Description: INTERVENTIONS:  - Administer medication as ordered  - Teach and encourage coping skills  - Provide emotional support  - Assess patient/family for anxiety and ability to cope  Outcome: Progressing

## 2023-08-29 NOTE — PHYSICAL THERAPY NOTE
PHYSICAL THERAPY EVALUATION          Patient Name: Kandice Vazquez Date: 8/29/2023 08/29/23 0840   Note Type   Note type Evaluation   Pain Assessment   Pain Assessment Tool 0-10   Pain Score No Pain   Restrictions/Precautions   Other Precautions Telemetry;Multiple lines; Fall Risk;Hard of hearing   Home Living   Type of [address] [place] [address] Two level;Bed/bath upstairs  (full bath on main level)   Bathroom Shower/Tub Tub/shower unit  (walk in first floor)   [address] bars around toilet   [address] [facility] Walker;Cane   Additional Comments Bed/bath upstairs + full bath on main level. Reports increasing difficulty with stairs and has to crawl up them. Wants to look into getting stair glide or day bed to sleep on first floor. Prior Function   Level of Kay Independent with ADLs; Independent with functional mobility; Independent with IADLS   Lives With Spouse  (works full time)   IADLs Independent with driving; Independent with medication management; Independent with meal prep   Falls in the last 6 months 0   Comments No AD use at baseline. General   Additional Pertinent History Admitted 8/28/23 for abdominal pain. Up and OOB orders. PMH sig for COPD, HTN, CKD. Cognition   Overall Cognitive Status WFL   Arousal/Participation Cooperative   Orientation Level Oriented X4   Memory Within functional limits   Following Commands Follows all commands and directions without difficulty   Subjective   Subjective "I've been getting more winded on the stairs recently."   RLE Assessment   RLE Assessment WFL   LLE Assessment   LLE Assessment WFL   Bed Mobility   Additional Comments Pt seated pre/post session. Transfers   Sit to Stand 5  Supervision   Additional items Increased time required   Stand to Sit 5  Supervision   Additional items Verbal cues;Armrests; Increased time required  (cues for hand placement and positioning) Ambulation/Elevation   Gait pattern Wide JENIFFER; Decreased foot clearance; Excessively slow; Short stride; Inconsistent rhonda  (lateral sway)   Gait Assistance 5  Supervision   Additional items Assist x 1  (CGA)   Assistive Device None   Distance 130'   Balance   Static Sitting Fair +   Dynamic Sitting Fair +   Static Standing Fair   Dynamic Standing Fair -   Ambulatory Fair -   Endurance Deficit   Endurance Deficit Yes   Endurance Deficit Description SOB with ambulation   Activity Tolerance   Activity Tolerance Patient limited by fatigue; Other (Comment)  (therapist limited 2* balance)   Medical Staff Made Aware OT Carly Moon PT Barbara   Nurse Made Aware [address] [facility] RN   Assessment   Prognosis Good   Problem List Impaired balance;Decreased endurance;Obesity; Decreased safety awareness; Impaired hearing   Assessment Tulio Hogue Sr. is a 70 y.o. male admitted to [address] [place] [address] on 8/28/2023 for Abdominal pain. PT was consulted and pt was seen on 8/29/2023 for mobility assessment and d/c planning. Pt presents with telemetry, multiple lines and increased risk for falls. Pt is currently functioning at a supervision assistance x1 level for transfers, and supervision assistance/CGA x1 level for ambulation with no assistive device. Pt demonstrated gait deviaitons, decreased endurance and impaired balance upon initial evaluation. Noted increased lateral sway and unsteadiness for ambulation without AD, providing CGA for safety. Pt may benefit from AD use (SPC vs RW) in future sessions for increased safety and balance. Ambulation distance limited from decreased endurance/SOB and therapsit limited 2* balance. At baseline, pt was independent with mobility wo AD. Pt will benefit from continued skilled IP PT to address the above mentioned impairments  in order to maximize recovery and increase functional independence when completing mobility and ADLs. At this time PT recommendations for d/c are home with HHPT.    Barriers to Discharge None Goals   Patient Goals to return home   STG Expiration Date 09/12/23   Short Term Goal #1 (1) Pt will perform bed mobility with mod I demonstrating appropriate form 100% of the time to promote improved functional mobility. (2) Perform all transfers with mod I and appropriate technique 100% of the time to promote ability to safely negotiate home environment. (3) Ambulate at least 250' with LRAD and mod I to promote safe negotiation of home environment. (4) Improve overall strength and balance 1/2 grade to optimize ability to perform functional mobility, ADLs, IADLs and demonstrate reduced fall risk. (5) Increase activity tolerance to 45 minutes to promote endurance for completion of functional tasks. (6) Negotiate flight of stairs with most appropriate technique and supervision to promote safe mobility in home environment. (7) Continue PT for ongoing pt/family education, DME needs, and d/c planning to promote highest level of function in least restricted environment. Plan   Treatment/Interventions Functional transfer training;Elevations;LE strengthening/ROM; Therapeutic exercise; Endurance training;Equipment eval/education;Patient/family training;Bed mobility;Gait training;Spoke to nursing;OT;Continued evaluation   PT Frequency 2-3x/wk   Recommendation   PT Discharge Recommendation Home with home health rehabilitation   Additional Comments may benefit from Yvan Lopez   Turning in Flat Bed Without Bedrails 4   Lying on Back to Sitting on Edge of Flat Bed Without Bedrails 3   Moving Bed to Chair 3   Standing Up From Chair Using Arms 3   Walk in Room 3   Climb 3-5 Stairs With Railing 2   Basic Mobility Inpatient Raw Score   18    The patient's AM-PAC Basic Mobility Inpatient Short Form Raw Score is 18. A Raw score of greater than or equal to 16 suggests the patient may benefit from discharge to home.  Please also refer to the recommendation of the Physical Therapist for safe discharge planning.      Basic Mobility Standardized Score 41.05   Highest Level Of Mobility   JH-HLM Goal 6: Walk 10 steps or more   JH-HLM Achieved 7: Walk 25 feet or more   End of Consult   Patient Position at End of Consult Other (comment)  (transport chair to be taken to GI)     Factors contributing to complexity  History: co-morbidities, social background, fall risk  Examination of body systems: MSK (strength, ROM), neuromuscular (sensation, balance, gait, transfers, motor function), functional (am-pac)  Clinical presentation: unpredictable  Complexity: moderate     Mavis Jacobs, SPT

## 2023-08-29 NOTE — TELEMEDICINE
e-Consult (IPC)  - Interventional Radiology  Cy Fraser. 70 y.o. male MRN: [id number]  Unit/Bed#: 15[address] [place] 205-01 Encounter: [id number]          Interventional Radiology has been consulted to evaluate [address] Shaneka Sr. We were consulted by heme onc concerning this patient with metastatic cancer. Inpatient Consult to IR  Consult performed by: MAHI Madsen  Consult ordered by: MAHI Wheat        08/29/23    Assessment/Recommendation:     70year old male with past medical history of HTN, GERD, COPD, chronic ITP and tobacco use. Patient has had 2 months of abdominal pain, intermittent N/V, loss of appetite, early satiety, and unintentional 14 pound weight loss. CT C/A/P demonstrates an omental mass measuring 8.9 x 5.2 cm, and several other masses along the gastrohepatic ligament, RUL lung masses, right adrenal lesion, mural thickening of the stomach and heterogenous attenuation, hepatic cirrhosis and small lesion in the posterior aspect, multiple masses throughout the abdominopelvic cavity, and a right sided nodule in the ventral lateral abdominal wall measuring 1.6 x 1.6 cm. Discussed with heme onc the difficulty of possibly reaching the liver lesion, however, can perform a right abdominal wall biopsy with local anesthetic at time of paracentesis. - does not need to be NPO  - possible abdominal wall biopsy today  - possible paracentesis today with full set of labs  - stat INR ordered prior to either procedure    11-20 minutes, >50% of the total time devoted to medical consultative verbal/EMR discussion between providers. Written report will be generated in the EMR. Thank you for allowing Interventional Radiology to participate in the care of Cy Fraser. . Please don't hesitate to call or TigerText us with any questions.      [facility]

## 2023-08-29 NOTE — PROGRESS NOTES
Intake received/ Chart reviewed for services completed outside of [facility]    Pathology completed: 8/29/2023 results pending from stomach tissue there is also bodily fluid being tested from a paracentesis performed this day    Imaging completed: 8/28/2023 CT CAP  Patient is currently hospitalized. There is a possibility of obtaining a liver bx. All records needed are in patients chart. No records retrieval needed at this time.

## 2023-08-29 NOTE — CONSULTS
Consultation - [address] [place] [address] [facility]  Dariakeshia Presley. 70 y.o. male MRN: [id number]  Unit/Bed#: 1575 [address] [place] 205-01 Encounter: [id number]              Inpatient consult to gastroenterology     Date/Time 8/28/2023 10:34 AM     Performed by  Leodan Lara DO   Authorized by Marcelino Branham MD             Reason for Consult / Principal Problem:     Abnormal CT scan      ASSESSMENT AND PLAN:      68-year-old male with past medical history of ITP, GERD, asthma, hypertension, tobacco abuse who is admitted to the hospital for failure to thrive, unintentional weight loss. GI is consulted due to abnormal CAT scan suggestive of gastric cancer. 1. Abnormal CT scan  2. Abdominal distention  3. Unintentional weight loss  4. Early satiety  5. Dysphagia  6. Hypercalcemia  7. Elevated LFTs  Sensation is highly concerning for malignancy particularly gastric cancer given CT findings. Also has evidence of diffuse metastasis. More than 5 years ago which was reported as normal per patient, no report available. Has macrocytic anemia likely due to malignancy. Hemoglobin is 11.7. Not appear clinically obstructed at this point. Elevated AST and mild hyperbilirubinemia is likely secondary to liver metastasis. · Plan for EGD today  · Continue n.p.o. status. · IV fluids per primary team.  · Continue Protonix 40 mg daily  · Continue Pepcid 20 mg nightly. · Pain control per primary team.  · As needed Zofran for nausea. · Check iron panel, follow-up vitamin B12 and folate. · Monitor LFTs, CBC daily. Rest of care per primary team.  Thank you for this consultation. ______________________________________________________________________    HPI:  68-year-old male with past medical history of ITP, GERD, asthma, hypertension, tobacco abuse who is admitted to the hospital for failure to thrive, unintentional weight loss. GI is consulted due to abnormal CAT scan suggestive of gastric cancer.   States he has had a 14 pound weight loss in the past 2 months unintentionally. He also has early satiety and mild dysphagia without regurgitation. Abdomen is distended and uncomfortable. Denies any significant nausea, vomiting. Has regular bowel movements up to 3 times per day. Denies any melena, hematochezia. Denies any family history of stomach or colon cancer. Does smoke pipe tobacco daily for many years. Denies significant alcohol use. Had an EGD over 5 years ago which was reported as normal.  No report is available. REVIEW OF SYSTEMS:    Review of Systems   Constitutional: Positive for appetite change and unexpected weight change. Negative for chills and fever. HENT: Negative for congestion and sinus pressure. Respiratory: Negative for cough and shortness of breath. Cardiovascular: Negative for chest pain, palpitations and leg swelling. Gastrointestinal: Positive for abdominal distention and abdominal pain. Negative for diarrhea, nausea and vomiting. Genitourinary: Negative for dysuria and hematuria. Musculoskeletal: Negative for arthralgias and back pain. Skin: Negative for color change and rash. Neurological: Negative for dizziness and headaches. Psychiatric/Behavioral: Negative for agitation and confusion. All other systems reviewed and are negative.          Historical Information   Past Medical History:   Diagnosis Date   • Acute right flank pain    • Arthritis     generalized   • Asthma     uses inhaler   • Back pain    • Cellulitis    • Chronic ITP (idiopathic thrombocytopenia) ([facility])    • Chronic kidney disease     stones   • COPD (chronic obstructive pulmonary disease) ([facility])    • Full dentures    • GERD (gastroesophageal reflux disease)    • Gout    • Hearing difficulty of both ears    • Hypertension    • ITP (idiopathic thrombocytopenic purpura)    • Lumbar disc herniation    • Renal mass    • Spinal stenosis of lumbar region    • Tobacco abuse    • Wears glasses      Past Surgical History: Procedure Laterality Date   • CARPAL TUNNEL RELEASE Bilateral    • CHOLECYSTECTOMY     • FINGER SURGERY Right     index finger   • LUMBAR LAMINECTOMY      spinal stenosis    • OR CYSTO/URETERO W/LITHOTRIPSY &INDWELL STENT INSRT Right 11/9/2016    Procedure: CYSTOSCOPY URETEROSCOPY WITH LITHOTRIPSY, RETROGRADE PYELOGRAM; BASKET STONE EXTRACTION ;  Surgeon: Rebecca Gutierrez MD;  Location: [facility];  Service: Urology   • OR CYSTO/URETERO W/LITHOTRIPSY &INDWELL STENT INSRT Right 9/28/2016    Procedure: CYSTOSCOPY, URETEROSCOPY, DILATION  OF URETERAL STRICTURE, RETROGRADE PYELOGRAM, AND INSERTION STENT URETERAL;  Surgeon: Rebecca Gutierrez MD;  Location: [facility];  Service: Urology     Social History   Social History     Substance and Sexual Activity   Alcohol Use Yes   • Alcohol/week: 1.0 standard drink of alcohol   • Types: 1 Cans of beer per week     Social History     Substance and Sexual Activity   Drug Use No     Social History     Tobacco Use   Smoking Status Light Smoker   • Types: Pipe   Smokeless Tobacco Never   Tobacco Comments    pipe once a day     History reviewed. No pertinent family history.     Meds/Allergies     Medications Prior to Admission   Medication   • acetaminophen (TYLENOL) 325 mg tablet   • amLODIPine (NORVASC) 5 mg tablet   • celecoxib (CeleBREX) 100 mg capsule   • cyanocobalamin (VITAMIN B-12) 500 mcg tablet   • famotidine (PEPCID) 20 mg tablet   • omeprazole (PRILOSEC) 20 mg delayed release capsule   • traMADol (ULTRAM) 50 mg tablet   • allopurinol (ZYLOPRIM) 100 mg tablet   • budesonide-formoterol (SYMBICORT) 160-4.5 mcg/act inhaler   • Cholecalciferol 50 MCG (2000 UT) CAPS   • Colchicine 0.6 MG CAPS   • Fish Oil OIL   • oxybutynin (DITROPAN-XL) 5 mg 24 hr tablet   • tamsulosin (FLOMAX) 0.4 mg     Current Facility-Administered Medications   Medication Dose Route Frequency   • allopurinol (ZYLOPRIM) tablet 100 mg  100 mg Oral Daily   • budesonide-formoterol (SYMBICORT) 160-4.5 mcg/act inhaler 2 puff  2 puff Inhalation BID   • cyanocobalamin (VITAMIN B-12) tablet 500 mcg  500 mcg Oral Daily   • dextrose 5 % and sodium chloride 0.9 % infusion  100 mL/hr Intravenous Continuous   • docusate sodium (COLACE) capsule 100 mg  100 mg Oral BID   • famotidine (PEPCID) tablet 20 mg  20 mg Oral HS   • heparin (porcine) subcutaneous injection 5,000 Units  5,000 Units Subcutaneous Q8H [address]   • ondansetron (ZOFRAN) injection 4 mg  4 mg Intravenous Q6H PRN   • pantoprazole (PROTONIX) EC tablet 40 mg  40 mg Oral Early Morning   • traMADol (ULTRAM) tablet 50 mg  50 mg Oral Q6H PRN       Allergies   Allergen Reactions   • Codeine GI Intolerance     Nausea and bleeding   • Atorvastatin Hives   • Fenofibrate Hives     Tricor causes muscle pain   • Hydrochlorothiazide Hives   • Prednisone Hives and Other (See Comments)     sensitivity             Objective     Blood pressure 101/59, pulse 88, temperature 98.2 °F (36.8 °C), temperature source Oral, resp. rate 16, height 5' 5" (1.651 m), weight 111 kg (243 lb 12.8 oz), SpO2 93 %. Body mass index is 40.57 kg/m². Intake/Output Summary (Last 24 hours) at 8/29/2023 0905  Last data filed at 8/28/2023 1401  Gross per 24 hour   Intake 1000 ml   Output --   Net 1000 ml         PHYSICAL EXAM:      Physical Exam  Vitals and nursing note reviewed. Constitutional:       General: He is not in acute distress. Appearance: Normal appearance. He is well-developed. He is not ill-appearing. HENT:      Head: Normocephalic and atraumatic. Mouth/Throat:      Mouth: Mucous membranes are moist.   Eyes:      Extraocular Movements: Extraocular movements intact. Conjunctiva/sclera: Conjunctivae normal.   Cardiovascular:      Rate and Rhythm: Normal rate. Pulses: Normal pulses. Pulmonary:      Effort: Pulmonary effort is normal.   Abdominal:      General: Abdomen is flat. Bowel sounds are normal. There is distension. Palpations: Abdomen is soft. Tenderness: There is no abdominal tenderness. There is no guarding. Musculoskeletal:      Cervical back: Neck supple. Right lower leg: No edema. Left lower leg: No edema. Skin:     General: Skin is warm and dry. Neurological:      General: No focal deficit present. Mental Status: He is alert and oriented to person, place, and time.    Psychiatric:         Mood and Affect: Mood normal.         Behavior: Behavior normal.          Lab Results:   Admission on 08/28/2023   Component Date Value   • WBC 08/28/2023 8.86    • RBC 08/28/2023 4.25    • Hemoglobin 08/28/2023 13.9    • Hematocrit 08/28/2023 42.6    • MCV 08/28/2023 100 (H)    • MCH 08/28/2023 32.7    • MCHC 08/28/2023 32.6    • RDW 08/28/2023 15.0    • MPV 08/28/2023 10.0    • Platelets 57/61/9382 196    • nRBC 08/28/2023 0    • Neutrophils Relative 08/28/2023 70    • Immat GRANS % 08/28/2023 1    • Lymphocytes Relative 08/28/2023 15    • Monocytes Relative 08/28/2023 10    • Eosinophils Relative 08/28/2023 3    • Basophils Relative 08/28/2023 1    • Neutrophils Absolute 08/28/2023 6.35    • Immature Grans Absolute 08/28/2023 0.04    • Lymphocytes Absolute 08/28/2023 1.30    • Monocytes Absolute 08/28/2023 0.90    • Eosinophils Absolute 08/28/2023 0.22    • Basophils Absolute 08/28/2023 0.05    • hs TnI 0hr 08/28/2023 7    • Color, UA 08/28/2023 Light Yellow    • Clarity, UA 08/28/2023 Clear    • Specific Gravity, UA 08/28/2023 >=1.050 (H)    • pH, UA 08/28/2023 6.0    • Leukocytes, UA 08/28/2023 Negative    • Nitrite, UA 08/28/2023 Negative    • Protein, UA 08/28/2023 Negative    • Glucose, UA 08/28/2023 Negative    • Ketones, UA 08/28/2023 Negative    • Urobilinogen, UA 08/28/2023 <2.0    • Bilirubin, UA 08/28/2023 Negative    • Occult Blood, UA 08/28/2023 Negative    • Sodium 08/28/2023 139    • Potassium 08/28/2023 4.3    • Chloride 08/28/2023 103    • CO2 08/28/2023 24    • ANION GAP 08/28/2023 12    • BUN 08/28/2023 22    • Creatinine 08/28/2023 1.31 (H)    • Glucose 08/28/2023 100    • Calcium 08/28/2023 12.6 (HH)    • Corrected Calcium 08/28/2023 13.4 (HH)    • AST 08/28/2023 66 (H)    • ALT 08/28/2023 37    • Alkaline Phosphatase 08/28/2023 125 (H)    • Total Protein 08/28/2023 6.4    • Albumin 08/28/2023 3.0 (L)    • Total Bilirubin 08/28/2023 1.13 (H)    • eGFR 08/28/2023 52    • hs TnI 2hr 08/28/2023 6    • Delta 2hr hsTnI 08/28/2023 -1    • Ventricular Rate 08/28/2023 86    • Atrial Rate 08/28/2023 90    • AR Interval 08/28/2023 164    • QRSD Interval 08/28/2023 86    • QT Interval 08/28/2023 344    • QTC Interval 08/28/2023 411    • P Axis 08/28/2023 44    • QRS Axis 08/28/2023 0    • T Wave Axis 08/28/2023 -1    • Ventricular Rate 08/28/2023 87    • Atrial Rate 08/28/2023 87    • AR Interval 08/28/2023 178    • QRSD Interval 08/28/2023 88    • QT Interval 08/28/2023 334    • QTC Interval 08/28/2023 401    • P Axis 08/28/2023 47    • QRS [place] 08/28/2023 -11    • T Wave Axis 08/28/2023 8    • Sodium 08/29/2023 139    • Potassium 08/29/2023 4.1    • Chloride 08/29/2023 105    • CO2 08/29/2023 23    • ANION GAP 08/29/2023 11    • BUN 08/29/2023 22    • Creatinine 08/29/2023 1.21    • Glucose 08/29/2023 63 (L)    • Calcium 08/29/2023 10.8 (H)    • eGFR 08/29/2023 57    • WBC 08/29/2023 6.21    • RBC 08/29/2023 3.60 (L)    • Hemoglobin 08/29/2023 11.7 (L)    • Hematocrit 08/29/2023 36.2 (L)    • MCV 08/29/2023 101 (H)    • MCH 08/29/2023 32.5    • MCHC 08/29/2023 32.3    • RDW 08/29/2023 14.8    • Platelets 96/52/4412 151    • MPV 08/29/2023 9.8    • Calcium, Ionized 08/29/2023 1.43 (H)    • TSH 3RD GENERATON 08/29/2023 4. 171    • Sodium 08/29/2023 139    • Potassium 08/29/2023 4.1    • Chloride 08/29/2023 105    • CO2 08/29/2023 23    • ANION GAP 08/29/2023 11    • BUN 08/29/2023 22    • Creatinine 08/29/2023 1.21    • Glucose 08/29/2023 63 (L)    • Calcium 08/29/2023 10.8 (H)    • Corrected Calcium 08/29/2023 12.0 (H)    • AST 08/29/2023 53 (H)    • ALT 08/29/2023 31    • Alkaline Phosphatase 08/29/2023 101    • Total Protein 08/29/2023 5.3 (L)    • Albumin 08/29/2023 2.5 (L)    • Total Bilirubin 08/29/2023 1.23 (H)    • eGFR 08/29/2023 57        Imaging Studies: I have personally reviewed pertinent imaging studies. [facility] D.OMaureen   Gastroenterology Fellow  PGY-5  Available via [employer]  8/29/2023 9:05 AM

## 2023-08-29 NOTE — PLAN OF CARE
Problem: PHYSICAL THERAPY ADULT  Goal: Performs mobility at highest level of function for planned discharge setting. See evaluation for individualized goals. Description: Treatment/Interventions: Functional transfer training, Elevations, LE strengthening/ROM, Therapeutic exercise, Endurance training, Equipment eval/education, Patient/family training, Bed mobility, Gait training, Spoke to nursing, OT, Continued evaluation          See flowsheet documentation for full assessment, interventions and recommendations. Note: Prognosis: Good  Problem List: Impaired balance, Decreased endurance, Obesity, Decreased safety awareness, Impaired hearing  Assessment: Pat Esquivel Sr. is a 70 y.o. male admitted to [address] [place] [address] on 8/28/2023 for Abdominal pain. PT was consulted and pt was seen on 8/29/2023 for mobility assessment and d/c planning. Pt presents with telemetry, multiple lines and increased risk for falls. Pt is currently functioning at a supervision assistance x1 level for transfers, and supervision assistance/CGA x1 level for ambulation with no assistive device. Pt demonstrated gait deviaitons, decreased endurance and impaired balance upon initial evaluation. Noted increased lateral sway and unsteadiness for ambulation without AD, providing CGA for safety. Pt may benefit from AD use (SPC vs RW) in future sessions for increased safety and balance. Ambulation distance limited from decreased endurance/SOB and therapsit limited 2* balance. At baseline, pt was independent with mobility wo AD. Pt will benefit from continued skilled IP PT to address the above mentioned impairments  in order to maximize recovery and increase functional independence when completing mobility and ADLs. At this time PT recommendations for d/c are home with HHPT. Barriers to Discharge: None     PT Discharge Recommendation: Home with home health rehabilitation    See flowsheet documentation for full assessment.

## 2023-08-29 NOTE — UTILIZATION REVIEW
Initial Clinical Review    Admission: Date/Time/Statement:   Admission Orders (From admission, onward)     Ordered        08/28/23 1407  INPATIENT ADMISSION  Once                      Orders Placed This Encounter   Procedures   • INPATIENT ADMISSION     Standing Status:   Standing     Number of Occurrences:   1     Order Specific Question:   Level of Care     Answer:   Med Surg [16]     Order Specific Question:   Estimated length of stay     Answer:   More than 2 Midnights     Order Specific Question:   Certification     Answer:   I certify that inpatient services are medically necessary for this patient for a duration of greater than two midnights. See H&P and MD Progress Notes for additional information about the patient's course of treatment. ED Arrival Information     Expected   -    Arrival   8/28/2023 10:13    Acuity   Urgent            Means of arrival   Wheelchair    Escorted by   Family Member    Service   Hospitalist    Admission type   Emergency            Arrival complaint   Abdominal Pain           Chief Complaint   Patient presents with   • Abdominal Pain     Patient c/o mid abdominal pain that began a few weeks ago. 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 Patient unable to keep any meat down. Initial Presentation: 70 y.o. male   To ER from home. PMH  GERD, COPD, HTN, ITP , smokes pipe. Presents to ER w/c/o  Acute on chronic (present for past 2 mo)    abd pain, N/V, Early satiety, bloating and unintentional 14 lb wgt loss over past 2 mo.      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  (possible metastasis with omental caking, pulmonary nodules, ventral abdominal wall nodule, right adrenal nodule, possible hepatic lesion, and lateral right ninth rib)    EXAM:  abd distention, no tenderness to palpation. Admit IP status,   MS Level of care for  Workup of abd pain/  R/o malignancy. Will cont telemetry,  Trend electrolytes. Consult GI For possible  Upper endoscopy w/biopsy. IVF for hypercalcemia  (w/o hyperreflexia)   IR consult for possible liver biopsy vs abd wall bx and paracentesis. CA 19 9 and CEA ordered, pending     Date: 8/29       Day 2:       EGD performed revealing 3 malignant appearing gastric ulcers, biopsies obtained. Oncology consulted. Recommending continuing with IV fluid resuscitation for hypercalcemia  (12.0 today)   Give single dose  Zometia. 8/29  IR:   possible abdominal wall biopsy today. possible paracentesis today with full set of labs.    stat INR ordered prior to either procedure    ED Triage Vitals   Temperature Pulse Respirations Blood Pressure SpO2   08/28/23 1021 08/28/23 1021 08/28/23 1021 08/28/23 1021 08/28/23 1021   98.4 °F (36.9 °C) 100 20 116/63 93 %      Temp Source Heart Rate Source Patient Position - Orthostatic VS BP Location FiO2 (%)   08/28/23 1021 08/28/23 1021 08/28/23 1021 08/28/23 1021 --   Oral Monitor Lying Left arm       Pain Score       08/28/23 1225       1          Wt Readings from Last 1 Encounters:   08/29/23 111 kg (243 lb 12.8 oz)     BMI 40.57    Additional Vital Signs:   08/29/23 13:19:36 -- 105 19 139/78 98 92 % -- -- --   08/29/23 10:53:06 97.8 °F (36.6 °C) 90 19 127/72 90 95 % -- -- --   08/29/23 1015 -- 89 21 138/67 -- 98 % 36 4 L/min Nasal cannula   08/29/23 1000 -- 90 21 123/72 -- 94 % 36 4 L/min Nasal cannula   08/29/23 0900 99.7 °F (37.6 °C) 90 26 Abnormal  116/57 -- 94 % 28 2 L/min Nasal cannula   08/29/23 08:23:06 -- 88 -- 101/59 73 93 % -- -- --   08/29/23 0815 -- -- -- -- -- -- -- -- None (Room air)   08/29/23 07:23:59 98.2 °F (36.8 °C) 94 16 127/106  113 92 % -- -- --   08/29/23 00:13:12 98.2 °F (36.8 °C) 119   18 107/56 73 93 % -- -- None (Room air)   08/28/23 19:06:31 98.2 °F (36.8 °C) 93 18 131/72 92 94 % -- -- None (Room air)   08/28/23 15:10:30 97.8 °F (36.6 °C) 93 16 113/56 75 95 % -- -- None (Room air)   08/28/23 1330 -- 90 21 112/61 81 95 % -- -- None (Room air)   08/28/23 1225 -- 67 18 117/56 -- 100 % -- -- None (Room air)           Pertinent Labs/Diagnostic Test Results:     8/28  EKG:    NSR    CT chest abdomen pelvis w contrast   Final Result by Markus Recinos MD (08/28 1344)   1. 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 2.  Cluster of nodules in the right upper lobe are concerning for pulmonary metastasis. 3.  Ventral abdominal wall nodule is new since 2016 and is concerning for metastasis. 4.  A right adrenal adenoma has slightly increased in size since October 10, 2016 and may represent a metastasis. 5.  Subcentimeter sclerotic focus in the lateral right 9th rib is indeterminate for metastasis. 6.  Hepatic cirrhosis. 7.  Possible hepatic lesion. Contrast-enhanced MRI can be considered for further assessment should it guide clinical management. 8.  Moderate volume ascites can be due to the metastatic disease and/or liver disease.          IR biopsy soft tissue/superficial    (Results Pending)           8/29  EGD:    Grade B esophagitis  Malignant-appearing ulcer in the body of the stomach and greater curve of the stomach with flat pigmented spot (Jonathon IIC); performed cold forceps biopsy  Single malignant-appearing ulcer in the body of the stomach and lesser curve of the stomach with clean base (Jonathon III); performed cold forceps biopsy  Malignant-appearing ulcer in the fundus of the stomach with clean base (Jonathon III); performed cold forceps biopsy  Edematous and erythematous mucosa with erosion in the body of the stomach, incisura and antrum  Performed forceps biopsies in the incisura and antrum to rule out H. pylori  The duodenum appeared normal.   RECOMMENDATION:    Await pathology results    Increase dose of PPI to 40 mg twice daily. Resume on ulcerogenic diet.             Results from last 7 days   Lab Units 08/29/23  0545 08/28/23  1047   WBC Thousand/uL 6.21 8.86   HEMOGLOBIN g/dL 11.7* 13.9   HEMATOCRIT % 36.2* 42.6   PLATELETS Thousands/uL 151 196   NEUTROS ABS Thousands/µL  --  6.35         Results from last 7 days   Lab Units 08/29/23  0545 08/28/23  1047   SODIUM mmol/L 139  139 139   POTASSIUM mmol/L 4.1  4.1 4.3   CHLORIDE mmol/L 105  105 103   CO2 mmol/L 23  23 24   ANION GAP mmol/L 11  11 12   BUN mg/dL 22  22 22   CREATININE mg/dL 1.21  1.21 1.31*   EGFR ml/min/1.73sq m 57  57 52   CALCIUM mg/dL 10.8*  10.8* 12.6*   CALCIUM, IONIZED mmol/L 1.43*  --      Results from last 7 days   Lab Units 08/29/23  0545 08/28/23  1047   AST U/L 53* 66*   ALT U/L 31 37   ALK PHOS U/L 101 125*   TOTAL PROTEIN g/dL 5.3* 6.4   ALBUMIN g/dL 2.5* 3.0*   TOTAL BILIRUBIN mg/dL 1.23* 1.13*     Results from last 7 days   Lab Units 08/29/23  0904   POC GLUCOSE mg/dl 71     Results from last 7 days   Lab Units 08/29/23  0545 08/28/23  1047   GLUCOSE RANDOM mg/dL 63*  63* 100       Results from last 7 days   Lab Units 08/28/23  1252 08/28/23  1047   HS TNI 0HR ng/L  --  7   HS TNI 2HR ng/L 6  --    HSTNI D2 ng/L -1  --          Results from last 7 days   Lab Units 08/29/23  1137   PROTIME seconds 15.3*   INR  1.21*     Results from last 7 days   Lab Units 08/29/23  0545   TSH 3RD GENERATON uIU/mL 4.171       Results from last 7 days   Lab Units 08/29/23  0545   CEA ng/mL 5.5*         Results from last 7 days   Lab Units 08/28/23  1405   CLARITY UA  Clear   COLOR UA  Light Yellow   SPEC GRAV UA  >=1.050*   PH UA  6.0   GLUCOSE UA mg/dl Negative   KETONES UA mg/dl Negative   BLOOD UA  Negative   PROTEIN UA mg/dl Negative   NITRITE UA  Negative   BILIRUBIN UA  Negative   UROBILINOGEN UA (BE) mg/dl <2.0   LEUKOCYTES UA  Negative       ED Treatment:   Medication Administration from 08/28/2023 1013 to 08/28/2023 1504       Date/Time Order Dose Route Action     08/28/2023 1228 EDT sodium chloride 0.9 % bolus 1,000 mL 1,000 mL Intravenous New Bag        Past Medical History:   Diagnosis Date   • Acute right flank pain    • Arthritis     generalized   • Asthma     uses inhaler   • Back pain    • Cellulitis    • Chronic ITP (idiopathic thrombocytopenia) (HCC)    • Chronic kidney disease     stones   • COPD (chronic obstructive pulmonary disease) (HCC)    • Full dentures    • GERD (gastroesophageal reflux disease)    • Gout    • Hearing difficulty of both ears    • Hypertension    • ITP (idiopathic thrombocytopenic purpura)    • Lumbar disc herniation    • Renal mass    • Spinal stenosis of lumbar region    • Tobacco abuse    • Wears glasses      Present on Admission:  • COPD (chronic obstructive pulmonary disease) (HCC)  • GERD (gastroesophageal reflux disease)  • Tobacco abuse  • Hypertension      Admitting Diagnosis: Metastatic cancer (HCC) [C79.9]  Abdominal pain [R10.9]  Abnormal CT of the abdomen [R93.5]  Ascites [R18.8]  Age/Sex: 70 y.o. male     Admission Orders:  scd's b/l LE;  PT/OT eval and treat. Daily cbc/cmp/ pte. Spot pulse oximetry q shift. Routine VS.  Daily wgt. I/O q shift. amb tid.  Non ulcerogenic diet (NPO after mn)       Scheduled Medications:  allopurinol, 100 mg, Oral, Daily  budesonide-formoterol, 2 puff, Inhalation, BID  vitamin B-12, 500 mcg, Oral, Daily  docusate sodium, 100 mg, Oral, BID  famotidine, 20 mg, Oral, HS  heparin (porcine), 5,000 Units, Subcutaneous, Q8H DARRELL  pantoprazole, 40 mg, Oral, BID AC  zoledronic acid, 4 mg, Intravenous, Once      Continuous IV Infusions:  sodium chloride, 100 mL/hr, Intravenous, Continuous      PRN Meds:  ondansetron, 4 mg, Intravenous, Q6H PRN  traMADol, 50 mg, Oral, Q6H PRN        IP CONSULT TO ONCOLOGY  IP CONSULT TO GASTROENTEROLOGY  INPATIENT CONSULT TO IR    Network Utilization Review Department  ATTENTION: Please call with any questions or concerns to [phone] and carefully listen to the prompts so that you are directed to the right person. All voicemails are confidential.  Hubert Abreu all requests for admission clinical reviews, approved or denied determinations and any other requests to dedicated fax number below belonging to the campus where the patient is receiving treatment.  List of dedicated fax numbers for the Facilities:  [place] DENIALS (Administrative/Medical Necessity) [phone] [address] (Maternity/NICU/Pediatrics) [phone]   [address] [place] [address] [phone]   [facility] [address] [facility] [phone]   [address] [place] [address] [phone]   [address] [facility] 1300 [facility] [address] [place] [address] [phone]

## 2023-08-29 NOTE — ASSESSMENT & PLAN NOTE
Patient with a history of chronic low back pain with previous spinal surgeries  · Patient notes he has chronic numbness of the left lower extremity extending from the knee distally, which his previous specialist attributed to a "bulging disc"  · Has received injections in the past

## 2023-08-29 NOTE — ASSESSMENT & PLAN NOTE
Patient is a 72-year-old male with past medical history significant for GERD, COPD, hypertension, ITP, and pipe use who presented to the ER for evaluation of abdominal pain, n/v, early satiety, bloating and unintentional 14lb weight loss over past 2 months  · Unfortunately, CT scan in the ER revealed diffuse gastric wall thickening, 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  · Possible metastasis with omental caking, pulmonary nodules, ventral abdominal wall nodule, right adrenal nodule, possible hepatic lesion, and lateral right ninth rib  · Gastroenterology consulted for upper endoscopy with biopsy  · EGD performed revealing 3 malignant appearing gastric ulcers, biopsies obtained  · Oncology consulted  · Recommending continuing with IV fluid resuscitation for hypercalcemia  · Recommending IR consult for possible liver biopsy versus abdominal wall biopsy and paracentesis  · We will need outpatient follow-up  · CA 19 9 and CEA ordered, pending

## 2023-08-29 NOTE — ASSESSMENT & PLAN NOTE
Patient clinically volume depleted with low-normal blood pressure ranging 116//56  · Will temporarily hold home Norvasc 5 mg daily avoid hypotension

## 2023-08-29 NOTE — OCCUPATIONAL THERAPY NOTE
Occupational Therapy Evaluation     Patient Name: Ammon Fernández Date: 8/29/2023  Problem List  Principal Problem:    Abdominal pain  Active Problems:    COPD (chronic obstructive pulmonary disease) ([facility])    Hypertension    GERD (gastroesophageal reflux disease)    Tobacco abuse    Hypercalcemia    Macrocytosis    Chronic ITP (idiopathic thrombocytopenia) ([facility])    Back pain    Morbidly obese (HCC)    Past Medical History  Past Medical History:   Diagnosis Date    Acute right flank pain     Arthritis     generalized    Asthma     uses inhaler    Back pain     Cellulitis     Chronic ITP (idiopathic thrombocytopenia) ([facility])     Chronic kidney disease     stones    COPD (chronic obstructive pulmonary disease) ([facility])     Full dentures     GERD (gastroesophageal reflux disease)     Gout     Hearing difficulty of both ears     Hypertension     ITP (idiopathic thrombocytopenic purpura)     Lumbar disc herniation     Renal mass     Spinal stenosis of lumbar region     Tobacco abuse     Wears glasses      Past Surgical History  Past Surgical History:   Procedure Laterality Date    CARPAL TUNNEL RELEASE Bilateral     CHOLECYSTECTOMY      FINGER SURGERY Right     index finger    LUMBAR LAMINECTOMY      spinal stenosis     ME CYSTO/URETERO W/LITHOTRIPSY &INDWELL STENT INSRT Right 11/9/2016    Procedure: CYSTOSCOPY URETEROSCOPY WITH LITHOTRIPSY, RETROGRADE PYELOGRAM; BASKET STONE EXTRACTION ;  Surgeon: Deepa Pitt MD;  Location: [facility];  Service: Urology    ME CYSTO/URETERO W/LITHOTRIPSY &INDWELL STENT INSRT Right 9/28/2016    Procedure: CYSTOSCOPY, URETEROSCOPY, DILATION  OF URETERAL STRICTURE, RETROGRADE PYELOGRAM, AND INSERTION STENT URETERAL;  Surgeon: Deepa Pitt MD;  Location: [facility];  Service: Urology           08/29/23 0828   OT Last Visit   OT Visit Date 08/29/23   Note Type   Note type Evaluation   Additional Comments Pt greeted in supine and agreeable to skilled OT evaluation. Pain Assessment   Pain Assessment Tool 0-10   Pain Score No Pain   Restrictions/Precautions   Weight Bearing Precautions Per Order No   Other Precautions Telemetry;Multiple lines; Fall Risk;Hard of hearing   Home Living   Type of [address] [place] [address] Two level;Bed/bath upstairs; Able to live on main level with bedroom/bathroom  (has a full bath on the main level.)   Bathroom Shower/Tub Tub/shower unit   Bathroom Toilet Standard   Bathroom Equipment Grab bars around toilet   Home Equipment Walker;Cane   Prior Function   Level of [place] Independent with ADLs; Independent with functional mobility; Independent with IADLS   Lives With Spouse  (Wife works FT)   [address] Help From Family   IADLs Independent with driving; Independent with meal prep; Independent with medication management   Falls in the last 6 months 0   Comments Prior to admission, pt lives with wife in a 2 level home with 0 SUNDAY through garage and 1 FOS to 2nd floor bed/ bath, also has a full bath on the first floor and is able to stay on 1st floor if needed. Has a tub and walk in shower, standard toilets. Pt was I with ADLS, IADLs and mobility with no device. Owns canes and RWs. Drives. Wife works. Reports 0 falls in the past 6 months. Reports he is looking into getting a stair glide to the 2nd floor since he currently crawls up the stairs. Lifestyle   Autonomy I with ADLs, IADLS and mobility. Reciprocal Relationships wife   ADL   Where Assessed Edge of bed   Eating Assistance 6  Modified independent   Grooming Assistance 5  Supervision/Setup   UB Bathing Assistance 5  Supervision/Setup   LB Bathing Assistance 5  Supervision/Setup   UB Dressing Assistance 5  Supervision/Setup   LB Dressing Assistance 5  Supervision/Setup   Toileting Assistance  5  Supervision/Setup   Bed Mobility   Supine to Sit Unable to assess   Additional Comments greeted seated EOB and left in chair with RN following session.    Transfers   Sit to Stand 5 Supervision   Stand to Sit 5  Supervision   Additional Comments cues for pacing, safety and best tech. Functional Mobility   Functional Mobility 5  Supervision   Additional Comments no device, functional distances. Balance   Static Sitting Good   Dynamic Sitting Fair +   Static Standing Fair   Dynamic Standing Fair -   Ambulatory Fair -   Activity Tolerance   Activity Tolerance Patient tolerated treatment well;Patient limited by fatigue   Medical Staff Made Aware PT Caitlyn Zuñiga, JERSEY Mendieta   Nurse Made Aware OFELIA Smith   RUE Assessment   RUE Assessment WFL   LUE Assessment   LUE Assessment WFL   Hand Function   Gross Motor Coordination Functional   Fine Motor Coordination Functional   Psychosocial   Psychosocial (WDL) WDL   Cognition   Overall Cognitive Status WFL   Arousal/Participation Cooperative   Attention Within functional limits   Orientation Level Oriented X4   Memory Within functional limits   Following Commands Follows all commands and directions without difficulty   Comments pleasant and cooperative. Assessment   Limitation Decreased ADL status; Decreased UE strength;Decreased cognition;Decreased self-care trans   Prognosis Good   Assessment Maia Tobar Sr. is a 70 y.o. male seen for OT evaluation s/p admit to [facility] on 8/28/2023 w/ Abdominal pain. Comorbidities affecting pt's functional performance at time of assessment include: GERD, COPD, hypertension, ITP, and pipe use. Pt with active OT orders and activity orders for Activity as tolerated. Personal factors affecting pt at time of IE include:SUNDAY home environment, steps within home environment, difficulty performing transfers/mobility, fall risk  and functional decline . Prior to admission, pt lives with wife in a 2 level home with 0 SUNDAY through garage and 1 FOS to 2nd floor bed/ bath, also has a full bath on the first floor and is able to stay on 1st floor if needed. Has a tub and walk in shower, standard toilets.  Pt was I with ADLS, IADLs and mobility with no device. Owns canes and RWs. Drives. Wife works. Reports 0 falls in the past 6 months. Reports he is looking into getting a stair glide to the 2nd floor since he currently crawls up the stairs. Upon evaluation: Pt currently requires supervision for UB ADLs, supervision for LB ADLs, supervision for toileting, supervision for bed mobility, supervision for functional transfers, and supervision mobility 2* the following deficits impacting occupational performance:weakness, decreased strength , decreased balance, decreased activity tolerance, decreased safety awareness and increased pain. Pt to benefit from continued skilled OT tx while in the hospital to address deficits as defined above and maximize level of functional independence w ADL's and functional mobility. Occupational Performance areas to address include: grooming, bathing/shower, toilet hygiene, dressing, functional mobility, community mobility and clothing management. From OT standpoint, recommendation at time of d/c would be home with [address] to follow pt on caseload 2-3x/wk. Goals   Patient Goals to go home.    STG Time Frame 3-5   Short Term Goal #1 Pt will improve activity tolerance to G for min 30 min txment sessions for increase engagement in functional tasks   Short Term Goal #2 Pt will complete LB dressing/self care w/ mod I using adaptive device and DME as needed   Short Term Goal  Pt will complete toileting w/ mod I w/ G hygiene/thoroughness using DME as needed   LTG Time Frame 10-14   Long Term Goal #1 Pt will improve functional transfers to Mod I on/off all surfaces using DME as needed w/ G balance/safety   Long Term Goal #2 Pt will improve functional mobility during ADL/IADL/leisure tasks to Mod I using DME as needed w/ G balance/safety   Long Term Goal Pt will demonstrate G carryover of pt/caregiver education and training as appropriate w/o cues w/ good tolerance to increase safety during functional tasks   Plan Treatment Interventions ADL retraining;Functional transfer training;UE strengthening/ROM; Endurance training;Patient/family training;Neuromuscular reeducation; Compensatory technique education; Energy conservation; Activityengagement   Goal Expiration Date 09/12/23   OT Treatment Day 0   OT Frequency 2-3x/wk   Recommendation   OT Discharge Recommendation Home with home health rehabilitation   Additional Comments  The patient's raw score on the AM-PAC Daily Activity Inpatient Short Form is 21. A raw score of greater than or equal to 19 suggests the patient may benefit from discharge to home. Please refer to the recommendation of the Occupational Therapist for safe discharge planning.    AM-PAC Daily Activity Inpatient   Lower Body Dressing 3   Bathing 3   Toileting 3   Upper Body Dressing 4   Grooming 4   Eating 4   Daily Activity Raw Score 21   Daily Activity Standardized Score (Calc for Raw Score >=11) 44.27   AM-PAC Applied Cognition Inpatient   Following a Speech/Presentation 4   Understanding Ordinary Conversation 4   Taking Medications 4   Remembering Where Things Are Placed or Put Away 4   Remembering List of 4-5 Errands 4   Taking Care of Complicated Tasks 4   Applied Cognition Raw Score 24   Applied Cognition Standardized Score 62.21   Saulo Krishnamurthy, OT

## 2023-08-29 NOTE — PROGRESS NOTES
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  · Possible metastasis with omental caking, pulmonary nodules, ventral abdominal wall nodule, right adrenal nodule, possible hepatic lesion, and lateral right ninth rib  · Gastroenterology consulted for upper endoscopy with biopsy  · EGD performed revealing 3 malignant appearing gastric ulcers, biopsies obtained  · Oncology consulted  · Recommending continuing with IV fluid resuscitation for hypercalcemia  · Recommending IR consult for possible liver biopsy versus abdominal wall biopsy and paracentesis  · We will need outpatient follow-up  · CA 19 9 and CEA ordered, pending    Morbidly obese ([address])  Assessment & Plan  BMI 40.57    Back pain  Assessment & Plan  Patient with a history of chronic low back pain with previous spinal surgeries  · Patient notes he has chronic numbness of the left lower extremity extending from the knee distally, which his previous specialist attributed to a "bulging disc"  · Has received injections in the past    Chronic ITP (idiopathic thrombocytopenia) ([address])  Assessment & Plan  Previous records in 2016: Patient has a history of ITP  · Current platelet count normal    Macrocytosis  Assessment & Plan  B12, folate, iron panel pending    Hypercalcemia  Assessment & Plan  Corrected calcium on admission 13.4  · Most likely hypercalcemia of malignancy  · Without hyperreflexia on exam  · Initiate IV fluids yesterday  · Corrected calcium improved to 12.0 today  · We will administer single dose of Zometa   · CMP in a.m. Tobacco abuse  Assessment & Plan  cessation counseled  · Nicotine patch offered    GERD (gastroesophageal reflux disease)  Assessment & Plan  Continue proton pump inhibitor    Hypertension  Assessment & Plan  Patient clinically volume depleted with low-normal blood pressure ranging 116//56  · Will temporarily hold home Norvasc 5 mg daily avoid hypotension    COPD (chronic obstructive pulmonary disease) ([facility])  Assessment & Plan  Without acute exacerbation  · Albuterol MDI as needed as of breath         VTE Pharmacologic Prophylaxis:   Moderate Risk (Score 3-4) - Pharmacological DVT Prophylaxis Ordered: heparin. Patient Centered Rounds: I performed bedside rounds with nursing staff today. Discussions with Specialists or Other Care Team Provider: oncology, gi    Education and Discussions with Family / Patient: Updated  (wife) at bedside. Total Time Spent on Date of Encounter in care of patient: 35 minutes This time was spent on one or more of the following: performing physical exam; counseling and coordination of care; obtaining or reviewing history; documenting in the medical record; reviewing/ordering tests, medications or procedures; communicating with other healthcare professionals and discussing with patient's family/caregivers. Current Length of Stay: 1 day(s)  Current Patient Status: Inpatient   Certification Statement: The patient will continue to require additional inpatient hospital stay due to biopsy, hyperca  Discharge Plan: Anticipate discharge in 48 hrs to home. Code Status: Level 1 - Full Code    Subjective:   Patient seen resting comfortably in bed following his EGD. EGD results were discussed with the patient and his wife at bedside.   That we will be consulting IR for abdominal wall biopsy and they are in agreement. He denies any worsening abdominal pain or discomfort, denies any fevers or chills overnight. Objective:     Vitals:   Temp (24hrs), Av.3 °F (36.8 °C), Min:97.8 °F (36.6 °C), Max:99.7 °F (37.6 °C)    Temp:  [97.8 °F (36.6 °C)-99.7 °F (37.6 °C)] 97.8 °F (36.6 °C)  HR:  [] 90  Resp:  [16-26] 19  BP: (101-138)/() 127/72  SpO2:  [92 %-100 %] 95 %  Body mass index is 40.57 kg/m². Input and Output Summary (last 24 hours): Intake/Output Summary (Last 24 hours) at 2023 1213  Last data filed at 2023 0956  Gross per 24 hour   Intake 1700 ml   Output --   Net 1700 ml       Physical Exam:   Physical Exam  Vitals and nursing note reviewed. Constitutional:       General: He is not in acute distress. Appearance: Normal appearance. He is ill-appearing. He is not toxic-appearing or diaphoretic. Comments: Chronically ill-appearing   HENT:      Head: Normocephalic and atraumatic. Cardiovascular:      Rate and Rhythm: Normal rate and regular rhythm. Heart sounds: No murmur heard. No friction rub. No gallop. Pulmonary:      Effort: Pulmonary effort is normal. No respiratory distress. Breath sounds: Normal breath sounds. No wheezing, rhonchi or rales. Abdominal:      General: Abdomen is flat. Bowel sounds are normal. There is distension. Palpations: Abdomen is soft. Tenderness: There is no abdominal tenderness. Comments: Abdominal distention, no tenderness to palpation   Musculoskeletal:      Right lower leg: No edema. Left lower leg: No edema. Skin:     General: Skin is warm and dry. Coloration: Skin is not jaundiced or pale. Neurological:      General: No focal deficit present. Mental Status: He is alert. Mental status is at baseline.           Additional Data:     Labs:  Results from last 7 days   Lab Units 23  0545 23  1047   WBC Thousand/uL 6.21 8.86   HEMOGLOBIN g/dL 11.7* 13.9   HEMATOCRIT % 36.2* 42.6   PLATELETS Thousands/uL 151 196   NEUTROS PCT %  --  70   LYMPHS PCT %  --  15   MONOS PCT %  --  10   EOS PCT %  --  3     Results from last 7 days   Lab Units 08/29/23  0545   SODIUM mmol/L 139  139   POTASSIUM mmol/L 4.1  4.1   CHLORIDE mmol/L 105  105   CO2 mmol/L 23  23   BUN mg/dL 22  22   CREATININE mg/dL 1.21  1.21   ANION GAP mmol/L 11  11   CALCIUM mg/dL 10.8*  10.8*   ALBUMIN g/dL 2.5*   TOTAL BILIRUBIN mg/dL 1.23*   ALK PHOS U/L 101   ALT U/L 31   AST U/L 53*   GLUCOSE RANDOM mg/dL 63*  63*     Results from last 7 days   Lab Units 08/29/23  1137   INR  1.21*     Results from last 7 days   Lab Units 08/29/23  0904   POC GLUCOSE mg/dl 71               Lines/Drains:  Invasive Devices     Peripheral Intravenous Line  Duration           Peripheral IV 08/28/23 Right Antecubital 1 day                  Telemetry:  Telemetry Orders (From admission, onward)             24 Hour Telemetry Monitoring  Continuous x 24 Hours (Telem)        Expiring   Question:  Reason for 24 Hour Telemetry  Answer:  Metabolic/electrolyte disturbance with high probability of dysrhythmia. K level <3 or >6 OR KCL infusion >10mEq/hr                 Telemetry Reviewed: Normal Sinus Rhythm  Indication for Continued Telemetry Use: No indication for continued use. Will discontinue.               Imaging: Reviewed radiology reports from this admission including: chest CT scan, abdominal/pelvic CT and procedure reports    Recent Cultures (last 7 days):         Last 24 Hours Medication List:   Current Facility-Administered Medications   Medication Dose Route Frequency Provider Last Rate   • allopurinol  100 mg Oral Daily Melissa Acuna MD     • budesonide-formoterol  2 puff Inhalation BID Melissa Acuna MD     • vitamin B-12  500 mcg Oral Daily Melissa Acuna MD     • docusate sodium  100 mg Oral BID Melissa Acuna MD     • famotidine  20 mg Oral HS Melissa Acuna MD     • heparin (porcine)  5,000 Units Subcutaneous [facility] Melsisa Acuna MD     • ondansetron  4 mg Intravenous Q6H PRN Melissa Acuna MD     • pantoprazole  40 mg Oral BID AC Tito JAGDEEP Tobin DO     • sodium chloride  100 mL/hr Intravenous Continuous Maciel Tabares PA-C     • traMADol  50 mg Oral Q6H PRN Melissa Acuna MD     • zoledronic acid  4 mg Intravenous Once MAHI Tom          Today, Patient Was Seen By: Maciel Tabares PA-C    **Please Note: This note may have been constructed using a voice recognition system. **

## 2023-08-29 NOTE — PLAN OF CARE
Problem: Potential for Falls  Goal: Patient will remain free of falls  Description: INTERVENTIONS:  - Educate patient/family on patient safety including physical limitations  - Instruct patient to call for assistance with activity   - Consult OT/PT to assist with strengthening/mobility   - Keep Call bell within reach  - Keep bed low and locked with side rails adjusted as appropriate  - Keep care items and personal belongings within reach  - Initiate and maintain comfort rounds  - Make Fall Risk Sign visible to staff  - Offer Toileting every  Hours, in advance of need  - Initiate/Maintain alarm  - Obtain necessary fall risk management equipment:   - Apply yellow socks and bracelet for high fall risk patients  - Consider moving patient to room near nurses station  Outcome: Progressing     Problem: MOBILITY - ADULT  Goal: Maintain or return to baseline ADL function  Description: INTERVENTIONS:  -  Assess patient's ability to carry out ADLs; assess patient's baseline for ADL function and identify physical deficits which impact ability to perform ADLs (bathing, care of mouth/teeth, toileting, grooming, dressing, etc.)  - Assess/evaluate cause of self-care deficits   - Assess range of motion  - Assess patient's mobility; develop plan if impaired  - Assess patient's need for assistive devices and provide as appropriate  - Encourage maximum independence but intervene and supervise when necessary  - Involve family in performance of ADLs  - Assess for home care needs following discharge   - Consider OT consult to assist with ADL evaluation and planning for discharge  - Provide patient education as appropriate  Outcome: Progressing  Goal: Maintains/Returns to pre admission functional level  Description: INTERVENTIONS:  - Perform BMAT or MOVE assessment daily.   - Set and communicate daily mobility goal to care team and patient/family/caregiver.    - Collaborate with rehabilitation services on mobility goals if consulted  - Perform Range of Motion  times a day. - Reposition patient every  hours.   - Dangle patient  times a day  - Stand patient  times a day  - Ambulate patient  times a day  - Out of bed to chair  times a day   - Out of bed for meals  times a day  - Out of bed for toileting  - Record patient progress and toleration of activity level   Outcome: Progressing     Problem: PAIN - ADULT  Goal: Verbalizes/displays adequate comfort level or baseline comfort level  Description: Interventions:  - Encourage patient to monitor pain and request assistance  - Assess pain using appropriate pain scale  - Administer analgesics based on type and severity of pain and evaluate response  - Implement non-pharmacological measures as appropriate and evaluate response  - Consider cultural and social influences on pain and pain management  - Notify physician/advanced practitioner if interventions unsuccessful or patient reports new pain  Outcome: Progressing     Problem: INFECTION - ADULT  Goal: Absence or prevention of progression during hospitalization  Description: INTERVENTIONS:  - Assess and monitor for signs and symptoms of infection  - Monitor lab/diagnostic results  - Monitor all insertion sites, i.e. indwelling lines, tubes, and drains  - Monitor endotracheal if appropriate and nasal secretions for changes in amount and color  - [place] appropriate cooling/warming therapies per order  - Administer medications as ordered  - Instruct and encourage patient and family to use good hand hygiene technique  - Identify and instruct in appropriate isolation precautions for identified infection/condition  Outcome: Progressing  Goal: Absence of fever/infection during neutropenic period  Description: INTERVENTIONS:  - Monitor WBC    Outcome: Progressing     Problem: SAFETY ADULT  Goal: Patient will remain free of falls  Description: INTERVENTIONS:  - Educate patient/family on patient safety including physical limitations  - Instruct patient to call for assistance with activity   - Consult OT/PT to assist with strengthening/mobility   - Keep Call bell within reach  - Keep bed low and locked with side rails adjusted as appropriate  - Keep care items and personal belongings within reach  - Initiate and maintain comfort rounds  - Make Fall Risk Sign visible to staff  - Offer Toileting every  Hours, in advance of need  - Initiate/Maintain alarm  - Obtain necessary fall risk management equipment:   - Apply yellow socks and bracelet for high fall risk patients  - Consider moving patient to room near nurses station  Outcome: Progressing  Goal: Maintain or return to baseline ADL function  Description: INTERVENTIONS:  -  Assess patient's ability to carry out ADLs; assess patient's baseline for ADL function and identify physical deficits which impact ability to perform ADLs (bathing, care of mouth/teeth, toileting, grooming, dressing, etc.)  - Assess/evaluate cause of self-care deficits   - Assess range of motion  - Assess patient's mobility; develop plan if impaired  - Assess patient's need for assistive devices and provide as appropriate  - Encourage maximum independence but intervene and supervise when necessary  - Involve family in performance of ADLs  - Assess for home care needs following discharge   - Consider OT consult to assist with ADL evaluation and planning for discharge  - Provide patient education as appropriate  Outcome: Progressing  Goal: Maintains/Returns to pre admission functional level  Description: INTERVENTIONS:  - Perform BMAT or MOVE assessment daily.   - Set and communicate daily mobility goal to care team and patient/family/caregiver. - Collaborate with rehabilitation services on mobility goals if consulted  - Perform Range of Motion  times a day. - Reposition patient every  hours.   - Dangle patient  times a day  - Stand patient  times a day  - Ambulate patient  times a day  - Out of bed to chair  times a day   - Out of bed for meals  times a day  - Out of bed for toileting  - Record patient progress and toleration of activity level   Outcome: Progressing     Problem: DISCHARGE PLANNING  Goal: Discharge to home or other facility with appropriate resources  Description: INTERVENTIONS:  - Identify barriers to discharge w/patient and caregiver  - Arrange for needed discharge resources and transportation as appropriate  - Identify discharge learning needs (meds, wound care, etc.)  - Arrange for interpretive services to assist at discharge as needed  - Refer to Case Management Department for coordinating discharge planning if the patient needs post-hospital services based on physician/advanced practitioner order or complex needs related to functional status, cognitive ability, or social support system  Outcome: Progressing     Problem: Knowledge Deficit  Goal: Patient/family/caregiver demonstrates understanding of disease process, treatment plan, medications, and discharge instructions  Description: Complete learning assessment and assess knowledge base.   Interventions:  - Provide teaching at level of understanding  - Provide teaching via preferred learning methods  Outcome: Progressing     Problem: GASTROINTESTINAL - ADULT  Goal: Maintains adequate nutritional intake  Description: INTERVENTIONS:  - Monitor percentage of each meal consumed  - Identify factors contributing to decreased intake, treat as appropriate  - Assist with meals as needed  - Monitor I&O, weight, and lab values if indicated  - Obtain nutrition services referral as needed  Outcome: Progressing     Problem: METABOLIC, FLUID AND ELECTROLYTES - ADULT  Goal: Electrolytes maintained within normal limits  Description: INTERVENTIONS:  - Monitor labs and assess patient for signs and symptoms of electrolyte imbalances  - Administer electrolyte replacement as ordered  - Monitor response to electrolyte replacements, including repeat lab results as appropriate  - Instruct patient on fluid and nutrition as appropriate  Outcome: Progressing     Problem: SKIN/TISSUE INTEGRITY - ADULT  Goal: Skin Integrity remains intact(Skin Breakdown Prevention)  Description: Assess:  -Perform Patel assessment every   -Clean and moisturize skin every   -Inspect skin when repositioning, toileting, and assisting with ADLS  -Assess under medical devices such as  every   -Assess extremities for adequate circulation and sensation     Bed Management:  -Have minimal linens on bed & keep smooth, unwrinkled  -Change linens as needed when moist or perspiring  -Avoid sitting or lying in one position for more than  hours while in bed  -Keep HOB at degrees     Toileting:  -Offer bedside commode  -Assess for incontinence every   -Use incontinent care products after each incontinent episode such as     Activity:  -Mobilize patient  times a day  -Encourage activity and walks on unit  -Encourage or provide ROM exercises   -Turn and reposition patient every  Hours  -Use appropriate equipment to lift or move patient in bed  -Instruct/ Assist with weight shifting every  when out of bed in chair  -Consider limitation of chair time  hour intervals    Skin Care:  -Avoid use of baby powder, tape, friction and shearing, hot water or constrictive clothing  -Relieve pressure over bony prominences using   -Do not massage red bony areas    Next Steps:  -Teach patient strategies to minimize risks such as    -Consider consults to  interdisciplinary teams such as  Outcome: Progressing     Problem: HEMATOLOGIC - ADULT  Goal: Maintains hematologic stability  Description: INTERVENTIONS  - Assess for signs and symptoms of bleeding or hemorrhage  - Monitor labs  - Administer supportive blood products/factors as ordered and appropriate  Outcome: Progressing     Problem: COPING  Goal: Pt/Family able to verbalize concerns and demonstrate effective coping strategies  Description: INTERVENTIONS:  - Assist patient/family to identify coping skills, available support systems and cultural and spiritual values  - Provide emotional support, including active listening and acknowledgement of concerns of patient and caregivers  - Reduce environmental stimuli, as able  - Provide patient education  - Assess for spiritual pain/suffering and initiate spiritual care, including notification of Pastoral Care or eda based community as needed  - Assess effectiveness of coping strategies  Outcome: Progressing  Goal: Will report anxiety at manageable levels  Description: INTERVENTIONS:  - Administer medication as ordered  - Teach and encourage coping skills  - Provide emotional support  - Assess patient/family for anxiety and ability to cope  Outcome: Progressing

## 2023-08-30 LAB
ALBUMIN SERPL BCP-MCNC: 2.4 G/DL (ref 3.5–5)
ALP SERPL-CCNC: 92 U/L (ref 34–104)
ALT SERPL W P-5'-P-CCNC: 29 U/L (ref 7–52)
ANION GAP SERPL CALCULATED.3IONS-SCNC: 11 MMOL/L
AST SERPL W P-5'-P-CCNC: 48 U/L (ref 13–39)
BILIRUB SERPL-MCNC: 1.11 MG/DL (ref 0.2–1)
BUN SERPL-MCNC: 21 MG/DL (ref 5–25)
CALCIUM ALBUM COR SERPL-MCNC: 11.5 MG/DL (ref 8.3–10.1)
CALCIUM SERPL-MCNC: 10.2 MG/DL (ref 8.4–10.2)
CANCER AG19-9 SERPL-ACNC: 55 U/ML (ref 0–35)
CHLORIDE SERPL-SCNC: 107 MMOL/L (ref 96–108)
CO2 SERPL-SCNC: 21 MMOL/L (ref 21–32)
CREAT SERPL-MCNC: 1.18 MG/DL (ref 0.6–1.3)
ERYTHROCYTE [DISTWIDTH] IN BLOOD BY AUTOMATED COUNT: 15.1 % (ref 11.6–15.1)
GFR SERPL CREATININE-BSD FRML MDRD: 59 ML/MIN/1.73SQ M
GLUCOSE SERPL-MCNC: 64 MG/DL (ref 65–140)
HCT VFR BLD AUTO: 36.1 % (ref 36.5–49.3)
HGB BLD-MCNC: 11.9 G/DL (ref 12–17)
MCH RBC QN AUTO: 32.9 PG (ref 26.8–34.3)
MCHC RBC AUTO-ENTMCNC: 33 G/DL (ref 31.4–37.4)
MCV RBC AUTO: 100 FL (ref 82–98)
PLATELET # BLD AUTO: 135 THOUSANDS/UL (ref 149–390)
PMV BLD AUTO: 10.6 FL (ref 8.9–12.7)
POTASSIUM SERPL-SCNC: 3.7 MMOL/L (ref 3.5–5.3)
PROT SERPL-MCNC: 5.2 G/DL (ref 6.4–8.4)
RBC # BLD AUTO: 3.62 MILLION/UL (ref 3.88–5.62)
SODIUM SERPL-SCNC: 139 MMOL/L (ref 135–147)
WBC # BLD AUTO: 5.28 THOUSAND/UL (ref 4.31–10.16)

## 2023-08-30 PROCEDURE — 99232 SBSQ HOSP IP/OBS MODERATE 35: CPT | Performed by: PHYSICIAN ASSISTANT

## 2023-08-30 PROCEDURE — 85027 COMPLETE CBC AUTOMATED: CPT | Performed by: PHYSICIAN ASSISTANT

## 2023-08-30 PROCEDURE — 80053 COMPREHEN METABOLIC PANEL: CPT | Performed by: PHYSICIAN ASSISTANT

## 2023-08-30 RX ORDER — HYDROMORPHONE HCL IN WATER/PF 6 MG/30 ML
0.2 PATIENT CONTROLLED ANALGESIA SYRINGE INTRAVENOUS EVERY 4 HOURS PRN
Status: DISCONTINUED | OUTPATIENT
Start: 2023-08-30 | End: 2023-08-31 | Stop reason: HOSPADM

## 2023-08-30 RX ORDER — AMOXICILLIN 250 MG
1 CAPSULE ORAL
Status: DISCONTINUED | OUTPATIENT
Start: 2023-08-30 | End: 2023-08-31 | Stop reason: HOSPADM

## 2023-08-30 RX ORDER — OXYCODONE HYDROCHLORIDE 5 MG/1
5 TABLET ORAL EVERY 4 HOURS PRN
Status: DISCONTINUED | OUTPATIENT
Start: 2023-08-30 | End: 2023-08-31 | Stop reason: HOSPADM

## 2023-08-30 RX ADMIN — BUDESONIDE AND FORMOTEROL FUMARATE DIHYDRATE 2 PUFF: 160; 4.5 AEROSOL RESPIRATORY (INHALATION) at 21:53

## 2023-08-30 RX ADMIN — CYANOCOBALAMIN TAB 500 MCG 500 MCG: 500 TAB at 08:35

## 2023-08-30 RX ADMIN — HEPARIN SODIUM 5000 UNITS: 5000 INJECTION INTRAVENOUS; SUBCUTANEOUS at 05:40

## 2023-08-30 RX ADMIN — SODIUM CHLORIDE 100 ML/HR: 0.9 INJECTION, SOLUTION INTRAVENOUS at 02:18

## 2023-08-30 RX ADMIN — PANTOPRAZOLE SODIUM 40 MG: 40 TABLET, DELAYED RELEASE ORAL at 17:17

## 2023-08-30 RX ADMIN — HEPARIN SODIUM 5000 UNITS: 5000 INJECTION INTRAVENOUS; SUBCUTANEOUS at 14:34

## 2023-08-30 RX ADMIN — HEPARIN SODIUM 5000 UNITS: 5000 INJECTION INTRAVENOUS; SUBCUTANEOUS at 21:47

## 2023-08-30 RX ADMIN — PANTOPRAZOLE SODIUM 40 MG: 40 TABLET, DELAYED RELEASE ORAL at 05:40

## 2023-08-30 RX ADMIN — ALLOPURINOL 100 MG: 100 TABLET ORAL at 08:35

## 2023-08-30 RX ADMIN — TRAMADOL HYDROCHLORIDE 50 MG: 50 TABLET, COATED ORAL at 08:34

## 2023-08-30 RX ADMIN — SENNOSIDES AND DOCUSATE SODIUM 1 TABLET: 50; 8.6 TABLET ORAL at 21:47

## 2023-08-30 RX ADMIN — BUDESONIDE AND FORMOTEROL FUMARATE DIHYDRATE 2 PUFF: 160; 4.5 AEROSOL RESPIRATORY (INHALATION) at 08:35

## 2023-08-30 RX ADMIN — FAMOTIDINE 20 MG: 20 TABLET ORAL at 21:47

## 2023-08-30 NOTE — ASSESSMENT & PLAN NOTE
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  · Possible metastasis with omental caking, pulmonary nodules, ventral abdominal wall nodule, right adrenal nodule, possible hepatic lesion, and lateral right ninth rib  · Gastroenterology consulted for upper endoscopy with biopsy  · EGD performed revealing 3 malignant appearing gastric ulcers, biopsies obtained  · Oncology consulted  · Recommending continuing with IV fluid resuscitation for hypercalcemia  · IR consulted and performed abdominal wall biopsy yesterday  · Also perform paracentesis for approximately 4 L of ascites  · We will need outpatient follow-up  · CA 19-9 and CEA both elevated

## 2023-08-30 NOTE — ASSESSMENT & PLAN NOTE
Corrected calcium on admission 13.4  · Most likely hypercalcemia of malignancy  · Without hyperreflexia on exam  · Initiate IV fluids yesterday  · Corrected calcium improved to 11.5 today with the administration of a single dose of Zometa yesterday  · We will continue with IV fluid resuscitation at this time  · CMP in a.m. with likely discharge following

## 2023-08-30 NOTE — PROGRESS NOTES
[facility]  Progress Note  Name: Daniel Brown I  MRN: [id number]  Unit/Bed#: [facility] -01 I Date of Admission: 8/28/2023   Date of Service: 8/30/2023 I Hospital Day: 2    Assessment/Plan   * Abdominal pain  Assessment & Plan  Patient is a 28-year-old male with past medical history significant for GERD, COPD, hypertension, ITP, and pipe use who presented to the ER for evaluation of abdominal pain, n/v, early satiety, bloating and unintentional 14lb weight loss over past 2 months  · Unfortunately, CT scan in the ER revealed diffuse gastric wall thickening, 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  · Possible metastasis with omental caking, pulmonary nodules, ventral abdominal wall nodule, right adrenal nodule, possible hepatic lesion, and lateral right ninth rib  · Gastroenterology consulted for upper endoscopy with biopsy  · EGD performed revealing 3 malignant appearing gastric ulcers, biopsies obtained  · Oncology consulted  · Recommending continuing with IV fluid resuscitation for hypercalcemia  · IR consulted and performed abdominal wall biopsy yesterday  · Also perform paracentesis for approximately 4 L of ascites  · We will need outpatient follow-up  · CA 19-9 and CEA both elevated    Morbidly obese ([address])  Assessment & Plan  BMI 40.57    Back pain  Assessment & Plan  Patient with a history of chronic low back pain with previous spinal surgeries  · Patient notes he has chronic numbness of the left lower extremity extending from the knee distally, which his previous specialist attributed to a "bulging disc"  · Has received injections in the past    Chronic ITP (idiopathic thrombocytopenia) ([address])  Assessment & Plan  Previous records in 2016: Patient has a history of ITP  · Current platelet count normal    Hypercalcemia  Assessment & Plan  Corrected calcium on admission 13.4  · Most likely hypercalcemia of malignancy  · Without hyperreflexia on exam  · Initiate IV fluids yesterday  · Corrected calcium improved to 11.5 today with the administration of a single dose of Zometa yesterday  · We will continue with IV fluid resuscitation at this time  · CMP in a.m. with likely discharge following    Tobacco abuse  Assessment & Plan  cessation counseled  · Nicotine patch offered    GERD (gastroesophageal reflux disease)  Assessment & Plan  Continue proton pump inhibitor    Hypertension  Assessment & Plan  Patient clinically volume depleted with low-normal blood pressure ranging 116//56  · Will temporarily hold home Norvasc 5 mg daily avoid hypotension    COPD (chronic obstructive pulmonary disease) ([facility])  Assessment & Plan  Without acute exacerbation  · Albuterol MDI as needed as of breath         VTE Pharmacologic Prophylaxis:   Moderate Risk (Score 3-4) - Pharmacological DVT Prophylaxis Ordered: heparin. Patient Centered Rounds: I performed bedside rounds with nursing staff today. Discussions with Specialists or Other Care Team Provider: None    Education and Discussions with Family / Patient: Attempted to update  (wife) via phone. Left voicemail. Total Time Spent on Date of Encounter in care of patient: 45 minutes This time was spent on one or more of the following: performing physical exam; counseling and coordination of care; obtaining or reviewing history; documenting in the medical record; reviewing/ordering tests, medications or procedures; communicating with other healthcare professionals and discussing with patient's family/caregivers. Current Length of Stay: 2 day(s)  Current Patient Status: Inpatient   Certification Statement: The patient will continue to require additional inpatient hospital stay due to Hypercalcemia  Discharge Plan: Anticipate discharge tomorrow to home. Code Status: Level 1 - Full Code    Subjective: The patient is seen resting comfortably in bed.   He reports some mild abdominal discomfort however improved since having the paracentesis yesterday. Otherwise he is feeling fine and he is in okay spirits. He denies any fevers or chills overnight, nausea or vomiting, diarrhea or constipation. Objective:     Vitals:   Temp (24hrs), Av.6 °F (37 °C), Min:98.5 °F (36.9 °C), Max:99 °F (37.2 °C)    Temp:  [98.5 °F (36.9 °C)-99 °F (37.2 °C)] 98.5 °F (36.9 °C)  HR:  [] 110  Resp:  [16-22] 16  BP: (100-139)/(50-78) 101/58  SpO2:  [92 %-94 %] 93 %  Body mass index is 41.05 kg/m². Input and Output Summary (last 24 hours): Intake/Output Summary (Last 24 hours) at 2023 1244  Last data filed at 2023 0217  Gross per 24 hour   Intake 900 ml   Output 4480 ml   Net -3580 ml       Physical Exam:   Physical Exam  Vitals and nursing note reviewed. Constitutional:       General: He is not in acute distress. Appearance: Normal appearance. He is not ill-appearing, toxic-appearing or diaphoretic. HENT:      Head: Normocephalic and atraumatic. Cardiovascular:      Rate and Rhythm: Normal rate and regular rhythm. Heart sounds: No murmur heard. No friction rub. No gallop. Pulmonary:      Effort: Pulmonary effort is normal. No respiratory distress. Breath sounds: Normal breath sounds. No wheezing, rhonchi or rales. Abdominal:      General: Abdomen is flat. Bowel sounds are normal. There is distension. Palpations: Abdomen is soft. Tenderness: There is no abdominal tenderness. Musculoskeletal:      Right lower leg: No edema. Left lower leg: No edema. Skin:     General: Skin is warm and dry. Coloration: Skin is not jaundiced or pale. Neurological:      General: No focal deficit present. Mental Status: He is alert. Mental status is at baseline.           Additional Data:     Labs:  Results from last 7 days   Lab Units 23  0504 23  0545 23  1047   WBC Thousand/uL 5.28   < > 8.86   HEMOGLOBIN g/dL 11.9*   < > 13.9   HEMATOCRIT % 36.1*   < > 42.6   PLATELETS Thousands/uL 135*   < > 196   NEUTROS PCT %  --   --  70   LYMPHS PCT %  --   --  15   MONOS PCT %  --   --  10   EOS PCT %  --   --  3    < > = values in this interval not displayed.      Results from last 7 days   Lab Units 08/30/23  0504   SODIUM mmol/L 139   POTASSIUM mmol/L 3.7   CHLORIDE mmol/L 107   CO2 mmol/L 21   BUN mg/dL 21   CREATININE mg/dL 1.18   ANION GAP mmol/L 11   CALCIUM mg/dL 10.2   ALBUMIN g/dL 2.4*   TOTAL BILIRUBIN mg/dL 1.11*   ALK PHOS U/L 92   ALT U/L 29   AST U/L 48*   GLUCOSE RANDOM mg/dL 64*     Results from last 7 days   Lab Units 08/29/23  1137   INR  1.21*     Results from last 7 days   Lab Units 08/29/23  0904   POC GLUCOSE mg/dl 71               Lines/Drains:  Invasive Devices     Peripheral Intravenous Line  Duration           Peripheral IV 08/28/23 Right Antecubital 2 days                             Imaging: Reviewed radiology reports from this admission including: procedure reports    Recent Cultures (last 7 days):   Results from last 7 days   Lab Units 08/29/23  1355   GRAM STAIN RESULT  3+ Polys  No bacteria seen       Last 24 Hours Medication List:   Current Facility-Administered Medications   Medication Dose Route Frequency Provider Last Rate   • allopurinol  100 mg Oral Daily Alina Huerta MD     • budesonide-formoterol  2 puff Inhalation BID Alina Huerta MD     • vitamin B-12  500 mcg Oral Daily Alina Huerta MD     • docusate sodium  100 mg Oral BID Alina Huerta MD     • famotidine  20 mg Oral HS Alina Huerta MD     • heparin (porcine)  5,000 Units Subcutaneous [facility] Alina Huerta MD     • HYDROmorphone  0.2 mg Intravenous Q4H PRN Ale Pitt PA-C     • naloxone  0.04 mg Intravenous Q1MIN PRN Ale Pitt PA-C     • ondansetron  4 mg Intravenous Q6H PRN Alina Huerta MD     • oxyCODONE  5 mg Oral Q4H PRN Ale Pitt PA-C     • oxyCODONE  2.5 mg Oral Q4H PRN Ale Pitt PA-C     • pantoprazole  40 mg Oral BID AC Tito G DO Crista     • senna-docusate sodium  1 tablet Oral HS Tristan Tristan PA-C     • sodium chloride  100 mL/hr Intravenous Continuous Tristan Tristan PA-C 100 mL/hr (08/30/23 1243)   • traMADol  50 mg Oral Q6H PRN Dex Mccallum MD          Today, Patient Was Seen By: Tristan Tristan PA-C    **Please Note: This note may have been constructed using a voice recognition system. **

## 2023-08-30 NOTE — PHYSICAL THERAPY NOTE
Physical Therapy Cancellation Note         08/30/23 1413   PT Last Visit   PT Visit Date 08/30/23   Note Type   Note type Cancelled Session   Cancel Reasons Refusal   Additional Comments Attempted to see pt for follow up PT session today. Pt declined states is is fatigued and would like to remain comfortable in bed until his visitors arrive. Will cont to follow as able.

## 2023-08-30 NOTE — PLAN OF CARE
Problem: Potential for Falls  Goal: Patient will remain free of falls  Description: INTERVENTIONS:  - Educate patient/family on patient safety including physical limitations  - Instruct patient to call for assistance with activity   - Consult OT/PT to assist with strengthening/mobility   - Keep Call bell within reach  - Keep bed low and locked with side rails adjusted as appropriate  - Keep care items and personal belongings within reach  - Initiate and maintain comfort rounds  - Make Fall Risk Sign visible to staff  - Offer Toileting every 2 Hours, in advance of need  - Initiate/Maintain bed alarm  - Obtain necessary fall risk management equipment: yellow socks  - Apply yellow socks and bracelet for high fall risk patients  - Consider moving patient to room near nurses station  Outcome: Progressing     Problem: MOBILITY - ADULT  Goal: Maintain or return to baseline ADL function  Description: INTERVENTIONS:  -  Assess patient's ability to carry out ADLs; assess patient's baseline for ADL function and identify physical deficits which impact ability to perform ADLs (bathing, care of mouth/teeth, toileting, grooming, dressing, etc.)  - Assess/evaluate cause of self-care deficits   - Assess range of motion  - Assess patient's mobility; develop plan if impaired  - Assess patient's need for assistive devices and provide as appropriate  - Encourage maximum independence but intervene and supervise when necessary  - Involve family in performance of ADLs  - Assess for home care needs following discharge   - Consider OT consult to assist with ADL evaluation and planning for discharge  - Provide patient education as appropriate  Outcome: Progressing  Goal: Maintains/Returns to pre admission functional level  Description: INTERVENTIONS:  - Perform BMAT or MOVE assessment daily.   - Set and communicate daily mobility goal to care team and patient/family/caregiver.    - Collaborate with rehabilitation services on mobility goals if consulted  - Perform Range of Motion 3 times a day. - Reposition patient every 2 hours.   - Dangle patient 3 times a day  - Stand patient 3 times a day  - Ambulate patient 3 times a day  - Out of bed to chair 3 times a day   - Out of bed for meals 3 times a day  - Out of bed for toileting  - Record patient progress and toleration of activity level   Outcome: Progressing     Problem: PAIN - ADULT  Goal: Verbalizes/displays adequate comfort level or baseline comfort level  Description: Interventions:  - Encourage patient to monitor pain and request assistance  - Assess pain using appropriate pain scale  - Administer analgesics based on type and severity of pain and evaluate response  - Implement non-pharmacological measures as appropriate and evaluate response  - Consider cultural and social influences on pain and pain management  - Notify physician/advanced practitioner if interventions unsuccessful or patient reports new pain  Outcome: Progressing     Problem: INFECTION - ADULT  Goal: Absence or prevention of progression during hospitalization  Description: INTERVENTIONS:  - Assess and monitor for signs and symptoms of infection  - Monitor lab/diagnostic results  - Monitor all insertion sites, i.e. indwelling lines, tubes, and drains  - Monitor endotracheal if appropriate and nasal secretions for changes in amount and color  - Pearson appropriate cooling/warming therapies per order  - Administer medications as ordered  - Instruct and encourage patient and family to use good hand hygiene technique  - Identify and instruct in appropriate isolation precautions for identified infection/condition  Outcome: Progressing  Goal: Absence of fever/infection during neutropenic period  Description: INTERVENTIONS:  - Monitor WBC    Outcome: Progressing     Problem: SAFETY ADULT  Goal: Patient will remain free of falls  Description: INTERVENTIONS:  - Educate patient/family on patient safety including physical limitations  - Instruct patient to call for assistance with activity   - Consult OT/PT to assist with strengthening/mobility   - Keep Call bell within reach  - Keep bed low and locked with side rails adjusted as appropriate  - Keep care items and personal belongings within reach  - Initiate and maintain comfort rounds  - Make Fall Risk Sign visible to staff  - Offer Toileting every 2 Hours, in advance of need  - Initiate/Maintain  bed alarm  - Obtain necessary fall risk management equipment: yellow socks  - Apply yellow socks and bracelet for high fall risk patients  - Consider moving patient to room near nurses station  Outcome: Progressing  Goal: Maintain or return to baseline ADL function  Description: INTERVENTIONS:  -  Assess patient's ability to carry out ADLs; assess patient's baseline for ADL function and identify physical deficits which impact ability to perform ADLs (bathing, care of mouth/teeth, toileting, grooming, dressing, etc.)  - Assess/evaluate cause of self-care deficits   - Assess range of motion  - Assess patient's mobility; develop plan if impaired  - Assess patient's need for assistive devices and provide as appropriate  - Encourage maximum independence but intervene and supervise when necessary  - Involve family in performance of ADLs  - Assess for home care needs following discharge   - Consider OT consult to assist with ADL evaluation and planning for discharge  - Provide patient education as appropriate  Outcome: Progressing  Goal: Maintains/Returns to pre admission functional level  Description: INTERVENTIONS:  - Perform BMAT or MOVE assessment daily.   - Set and communicate daily mobility goal to care team and patient/family/caregiver. - Collaborate with rehabilitation services on mobility goals if consulted  - Perform Range of Motion 3 times a day. - Reposition patient every 2 hours.   - Dangle patient 3 times a day  - Stand patient 3 times a day  - Ambulate patient 3 times a day  - Out of bed to chair 3 times a day   - Out of bed for meals 3 times a day  - Out of bed for toileting  - Record patient progress and toleration of activity level   Outcome: Progressing     Problem: DISCHARGE PLANNING  Goal: Discharge to home or other facility with appropriate resources  Description: INTERVENTIONS:  - Identify barriers to discharge w/patient and caregiver  - Arrange for needed discharge resources and transportation as appropriate  - Identify discharge learning needs (meds, wound care, etc.)  - Arrange for interpretive services to assist at discharge as needed  - Refer to Case Management Department for coordinating discharge planning if the patient needs post-hospital services based on physician/advanced practitioner order or complex needs related to functional status, cognitive ability, or social support system  Outcome: Progressing     Problem: Knowledge Deficit  Goal: Patient/family/caregiver demonstrates understanding of disease process, treatment plan, medications, and discharge instructions  Description: Complete learning assessment and assess knowledge base.   Interventions:  - Provide teaching at level of understanding  - Provide teaching via preferred learning methods  Outcome: Progressing     Problem: GASTROINTESTINAL - ADULT  Goal: Maintains adequate nutritional intake  Description: INTERVENTIONS:  - Monitor percentage of each meal consumed  - Identify factors contributing to decreased intake, treat as appropriate  - Assist with meals as needed  - Monitor I&O, weight, and lab values if indicated  - Obtain nutrition services referral as needed  Outcome: Progressing     Problem: METABOLIC, FLUID AND ELECTROLYTES - ADULT  Goal: Electrolytes maintained within normal limits  Description: INTERVENTIONS:  - Monitor labs and assess patient for signs and symptoms of electrolyte imbalances  - Administer electrolyte replacement as ordered  - Monitor response to electrolyte replacements, including repeat lab results as appropriate  - Instruct patient on fluid and nutrition as appropriate  Outcome: Progressing     Problem: SKIN/TISSUE INTEGRITY - ADULT  Goal: Skin Integrity remains intact(Skin Breakdown Prevention)  Description: Assess:  -Perform Patel assessment every shift  -Clean and moisturize skin every shift  -Inspect skin when repositioning, toileting, and assisting with ADLS  -Assess under medical devices such as masimo every shift-Assess extremities for adequate circulation and sensation     Bed Management:  -Have minimal linens on bed & keep smooth, unwrinkled  -Change linens as needed when moist or perspiring  -Avoid sitting or lying in one position for more than 2 hours while in bed  -Keep HOB at 30 degrees     Toileting:  -Offer bedside commode  -Assess for incontinence every hour  -Use incontinent care products after each incontinent episode such as lotion    Activity:  -Mobilize patient 3 times a day  -Encourage activity and walks on unit  -Encourage or provide ROM exercises   -Turn and reposition patient every 2 Hours  -Use appropriate equipment to lift or move patient in bed  -Instruct/ Assist with weight shifting every 1 hr when out of bed in chair  -Consider limitation of chair time 1 hour intervals    Skin Care:  -Avoid use of baby powder, tape, friction and shearing, hot water or constrictive clothing  -Relieve pressure over bony prominences using allevyns  -Do not massage red bony areas    Next Steps:  -Teach patient strategies to minimize risks such as repositioning   -Consider consults to  interdisciplinary teams such as PT/OT  Outcome: Progressing     Problem: HEMATOLOGIC - ADULT  Goal: Maintains hematologic stability  Description: INTERVENTIONS  - Assess for signs and symptoms of bleeding or hemorrhage  - Monitor labs  - Administer supportive blood products/factors as ordered and appropriate  Outcome: Progressing     Problem: COPING  Goal: Pt/Family able to verbalize concerns and demonstrate effective coping strategies  Description: INTERVENTIONS:  - Assist patient/family to identify coping skills, available support systems and cultural and spiritual values  - Provide emotional support, including active listening and acknowledgement of concerns of patient and caregivers  - Reduce environmental stimuli, as able  - Provide patient education  - Assess for spiritual pain/suffering and initiate spiritual care, including notification of Pastoral Care or eda based community as needed  - Assess effectiveness of coping strategies  Outcome: Progressing  Goal: Will report anxiety at manageable levels  Description: INTERVENTIONS:  - Administer medication as ordered  - Teach and encourage coping skills  - Provide emotional support  - Assess patient/family for anxiety and ability to cope  Outcome: Progressing     Problem: Nutrition/Hydration-ADULT  Goal: Nutrient/Hydration intake appropriate for improving, restoring or maintaining nutritional needs  Description: Monitor and assess patient's nutrition/hydration status for malnutrition. Collaborate with interdisciplinary team and initiate plan and interventions as ordered. Monitor patient's weight and dietary intake as ordered or per policy. Utilize nutrition screening tool and intervene as necessary. Determine patient's food preferences and provide high-protein, high-caloric foods as appropriate.      INTERVENTIONS:  - Monitor oral intake, urinary output, labs, and treatment plans  - Assess nutrition and hydration status and recommend course of action  - Evaluate amount of meals eaten  - Assist patient with eating if necessary   - Allow adequate time for meals  - Recommend/ encourage appropriate diets, oral nutritional supplements, and vitamin/mineral supplements  - Order, calculate, and assess calorie counts as needed  - Recommend, monitor, and adjust tube feedings and TPN/PPN based on assessed needs  - Assess need for intravenous fluids  - Provide specific nutrition/hydration education as appropriate  - Include patient/family/caregiver in decisions related to nutrition  Outcome: Progressing

## 2023-08-30 NOTE — CASE MANAGEMENT
Case Management Assessment & Discharge Planning Note    Patient name Adrienne Speaker 2 /South 2 Vipul Bo* MRN [id number]  : 1945 Date 2023       Current Admission Date: 2023  Current Admission Diagnosis:Abdominal pain   Patient Active Problem List    Diagnosis Date Noted   • Morbidly obese ([address]) 2023   • Abdominal pain 2023   • Hypercalcemia 2023   • Macrocytosis 2023   • Chronic ITP (idiopathic thrombocytopenia) ([address]) 2023   • Back pain 2023   • Nephrolithiasis 2016   • Horseshoe kidney 2016   • COPD (chronic obstructive pulmonary disease) ([address])    • Hypertension    • GERD (gastroesophageal reflux disease)    • Anxiety    • Tobacco abuse    • Renal mass       LOS (days): 2  Geometric Mean LOS (GMLOS) (days):   Days to GMLOS:     OBJECTIVE:    Risk of Unplanned Readmission Score: 9.81         Current admission status: Inpatient       Preferred Pharmacy:   [id number] [address] [place] [address]  Phone: [phone] Fax: [phone]    Primary Care Provider: Niurka Wheeler MD    Primary Insurance: BLUE CROSS  Secondary Insurance: MEDICARE    ASSESSMENT:  [place] Proxies    There are no active Health Care Proxies on file.        Advance Directives  Does patient have a [facility]?: No  Was patient offered paperwork?: Yes (Gave and explained paperwork)  Does patient currently have a Health Care decision maker?: Yes, please see Health Care Proxy section Vickie Fatumadelia (Spouse)   [address]   [facility] ())  Does patient have Advance Directives?: No  Was patient offered paperwork?: Yes (Gave and explained papework)  Primary Contact: Vickie Fatumadelia (Spouse)   [address] [id number] [facility]   [phone] (H)   [phone] (M)         Readmission Root Cause  30 Day Readmission: No    Patient Information  Admitted from[de-identified] Home  Mental Status: Alert  During Assessment patient was accompanied by: Not accompanied during assessment  Assessment information provided by[de-identified] Patient  Primary Caregiver: Self  Support Systems: Spouse/significant other  [facility] Residence: [facility] do you live in?: [address] [place] [address] entry access options.  Select all that apply.: No steps to enter home  Type of Current Residence: 2 story home  Upon entering residence, is there a bedroom on the main floor (no further steps)?: No  A bedroom is located on the following floor levels of residence (select all that apply):: 2nd Floor  Upon entering residence, is there a bathroom on the main floor (no further steps)?: Yes  Number of steps to 2nd floor from main floor: One Flight  In the last 12 months, was there a time when you were not able to pay the mortgage or rent on time?: No  In the last 12 months, how many places have you lived?: 1  In the last 12 months, was there a time when you did not have a steady place to sleep or slept in a shelter (including now)?: No  Homeless/housing insecurity resource given?: N/A  Living Arrangements: Lives w/ Spouse/significant other  Is patient a ?: No    Activities of Daily Living Prior to Admission  Completes ADLs independently?: Yes  Ambulates independently?: Yes  Does patient use assisted devices?: No  Does patient currently own DME?: Yes  What DME does the patient currently own?: Jolene Eng  Does patient have a history of Outpatient Therapy (PT/OT)?: No  Does the patient have a history of Short-Term Rehab?: No  Does patient have a history of HHC?: No  Does patient currently have [address]?: No         Patient Information Continued  Income Source: Pension/shelter  Does patient have prescription coverage?: Yes  Within the past 12 months, you worried that your food would run out before you got the money to buy more.: Never true  Within the past 12 months, the food you bought just didn't last and you didn't have money to get more.: Never true  Food insecurity resource given?: N/A  Does patient receive dialysis treatments?: No  Does patient have a history of substance abuse?: No  Does patient have a history of Mental Health Diagnosis?: No (Anxiety)         Means of Transportation  Means of Transport to Appts[de-identified] Drives Self  In the past 12 months, has lack of transportation kept you from medical appointments or from getting medications?: No  In the past 12 months, has lack of transportation kept you from meetings, work, or from getting things needed for daily living?: No  Was application for public transport provided?: N/A        DISCHARGE DETAILS:    Discharge planning discussed with[de-identified] pt        CM contacted family/caregiver?: No- see comments (Pt will keep his spouse informed.)  Were Treatment Team discharge recommendations reviewed with patient/caregiver?: Yes  Did patient/caregiver verbalize understanding of patient care needs?: Yes  Were patient/caregiver advised of the risks associated with not following Treatment Team discharge recommendations?: Yes         [address]         Is the patient interested in [facility] at discharge?: No        Treatment Team Recommendation: Home with [address] [facility]  Discharge Destination Plan[de-identified] Home             Additional Comments: Met with pt to complete assessment and pt decline VNA. The plan is home with spouse.

## 2023-08-31 ENCOUNTER — PATIENT OUTREACH (OUTPATIENT)
Dept: HEMATOLOGY ONCOLOGY | Facility: CLINIC | Age: 78
End: 2023-08-31

## 2023-08-31 VITALS
WEIGHT: 243.39 LBS | OXYGEN SATURATION: 95 % | BODY MASS INDEX: 40.55 KG/M2 | DIASTOLIC BLOOD PRESSURE: 75 MMHG | SYSTOLIC BLOOD PRESSURE: 123 MMHG | RESPIRATION RATE: 16 BRPM | TEMPERATURE: 98.4 F | HEART RATE: 100 BPM | HEIGHT: 65 IN

## 2023-08-31 PROBLEM — K74.60 CIRRHOSIS OF LIVER WITH ASCITES (HCC): Status: ACTIVE | Noted: 2023-08-31

## 2023-08-31 PROBLEM — R18.8 CIRRHOSIS OF LIVER WITH ASCITES (HCC): Status: ACTIVE | Noted: 2023-08-31

## 2023-08-31 LAB
ALBUMIN SERPL BCP-MCNC: 2.4 G/DL (ref 3.5–5)
ALP SERPL-CCNC: 86 U/L (ref 34–104)
ALT SERPL W P-5'-P-CCNC: 31 U/L (ref 7–52)
ANION GAP SERPL CALCULATED.3IONS-SCNC: 12 MMOL/L
AST SERPL W P-5'-P-CCNC: 55 U/L (ref 13–39)
BILIRUB SERPL-MCNC: 1.02 MG/DL (ref 0.2–1)
BUN SERPL-MCNC: 19 MG/DL (ref 5–25)
CALCIUM ALBUM COR SERPL-MCNC: 10.9 MG/DL (ref 8.3–10.1)
CALCIUM SERPL-MCNC: 9.6 MG/DL (ref 8.4–10.2)
CHLORIDE SERPL-SCNC: 107 MMOL/L (ref 96–108)
CO2 SERPL-SCNC: 21 MMOL/L (ref 21–32)
CREAT SERPL-MCNC: 1.14 MG/DL (ref 0.6–1.3)
ERYTHROCYTE [DISTWIDTH] IN BLOOD BY AUTOMATED COUNT: 15.1 % (ref 11.6–15.1)
GFR SERPL CREATININE-BSD FRML MDRD: 62 ML/MIN/1.73SQ M
GLUCOSE SERPL-MCNC: 69 MG/DL (ref 65–140)
HCT VFR BLD AUTO: 36.7 % (ref 36.5–49.3)
HGB BLD-MCNC: 11.7 G/DL (ref 12–17)
LDH SERPL-CCNC: 344 U/L (ref 140–271)
LYMPHOCYTES NFR BLD AUTO: 1 %
MALIGNANT CELLS NFR FLD MANUAL: 97 %
MCH RBC QN AUTO: 32.3 PG (ref 26.8–34.3)
MCHC RBC AUTO-ENTMCNC: 31.9 G/DL (ref 31.4–37.4)
MCV RBC AUTO: 101 FL (ref 82–98)
NEUTS SEG NFR BLD AUTO: 2 %
PATHOLOGIST INTERPRETATION: NORMAL
PATHOLOGY REVIEW: YES
PLATELET # BLD AUTO: 134 THOUSANDS/UL (ref 149–390)
PMV BLD AUTO: 10.4 FL (ref 8.9–12.7)
POTASSIUM SERPL-SCNC: 3.7 MMOL/L (ref 3.5–5.3)
PROT SERPL-MCNC: 5.1 G/DL (ref 6.4–8.4)
RBC # BLD AUTO: 3.62 MILLION/UL (ref 3.88–5.62)
SODIUM SERPL-SCNC: 140 MMOL/L (ref 135–147)
TOTAL CELLS COUNTED SPEC: 100
URATE SERPL-MCNC: 9.2 MG/DL (ref 3.5–8.5)
WBC # BLD AUTO: 5.7 THOUSAND/UL (ref 4.31–10.16)

## 2023-08-31 PROCEDURE — 80053 COMPREHEN METABOLIC PANEL: CPT | Performed by: PHYSICIAN ASSISTANT

## 2023-08-31 PROCEDURE — 85027 COMPLETE CBC AUTOMATED: CPT | Performed by: PHYSICIAN ASSISTANT

## 2023-08-31 PROCEDURE — 83615 LACTATE (LD) (LDH) ENZYME: CPT | Performed by: NURSE PRACTITIONER

## 2023-08-31 PROCEDURE — 99232 SBSQ HOSP IP/OBS MODERATE 35: CPT | Performed by: INTERNAL MEDICINE

## 2023-08-31 PROCEDURE — 99239 HOSP IP/OBS DSCHRG MGMT >30: CPT | Performed by: FAMILY MEDICINE

## 2023-08-31 PROCEDURE — 99233 SBSQ HOSP IP/OBS HIGH 50: CPT | Performed by: NURSE PRACTITIONER

## 2023-08-31 PROCEDURE — 84550 ASSAY OF BLOOD/URIC ACID: CPT | Performed by: NURSE PRACTITIONER

## 2023-08-31 RX ORDER — FUROSEMIDE 40 MG/1
40 TABLET ORAL DAILY
Status: DISCONTINUED | OUTPATIENT
Start: 2023-08-31 | End: 2023-08-31 | Stop reason: HOSPADM

## 2023-08-31 RX ORDER — AMOXICILLIN 250 MG
1 CAPSULE ORAL
Qty: 30 TABLET | Refills: 0 | Status: SHIPPED | OUTPATIENT
Start: 2023-08-31

## 2023-08-31 RX ORDER — SPIRONOLACTONE 100 MG/1
100 TABLET, FILM COATED ORAL DAILY
Qty: 30 TABLET | Refills: 0 | Status: SHIPPED | OUTPATIENT
Start: 2023-08-31

## 2023-08-31 RX ORDER — FUROSEMIDE 40 MG/1
40 TABLET ORAL DAILY
Qty: 30 TABLET | Refills: 0 | Status: SHIPPED | OUTPATIENT
Start: 2023-08-31

## 2023-08-31 RX ORDER — SPIRONOLACTONE 50 MG/1
100 TABLET, FILM COATED ORAL DAILY
Status: DISCONTINUED | OUTPATIENT
Start: 2023-08-31 | End: 2023-08-31 | Stop reason: HOSPADM

## 2023-08-31 RX ADMIN — BUDESONIDE AND FORMOTEROL FUMARATE DIHYDRATE 2 PUFF: 160; 4.5 AEROSOL RESPIRATORY (INHALATION) at 09:44

## 2023-08-31 RX ADMIN — CYANOCOBALAMIN TAB 500 MCG 500 MCG: 500 TAB at 09:44

## 2023-08-31 RX ADMIN — SPIRONOLACTONE 100 MG: 50 TABLET, FILM COATED ORAL at 12:49

## 2023-08-31 RX ADMIN — SODIUM CHLORIDE 100 ML/HR: 0.9 INJECTION, SOLUTION INTRAVENOUS at 01:32

## 2023-08-31 RX ADMIN — HEPARIN SODIUM 5000 UNITS: 5000 INJECTION INTRAVENOUS; SUBCUTANEOUS at 06:24

## 2023-08-31 RX ADMIN — FUROSEMIDE 40 MG: 40 TABLET ORAL at 12:49

## 2023-08-31 RX ADMIN — PANTOPRAZOLE SODIUM 40 MG: 40 TABLET, DELAYED RELEASE ORAL at 06:24

## 2023-08-31 RX ADMIN — ALLOPURINOL 100 MG: 100 TABLET ORAL at 09:44

## 2023-08-31 NOTE — DISCHARGE SUMMARY
[facility]  Discharge- Herve Linear Sr. 1945, 70 y.o. male MRN: [id number]  Unit/Bed#: [place] [id number] [facility] [address]01 Encounter: [id number]  Primary Care Provider: Dorcas Thomson MD   Date and time admitted to hospital: 8/28/2023 10:34 AM    * Abdominal pain  Assessment & Plan  Patient is a 59-year-old male with past medical history significant for GERD, COPD, hypertension, ITP, and pipe use who presented to the ER for evaluation of abdominal pain, n/v, early satiety, bloating and unintentional 14lb weight loss over past 2 months  · Unfortunately, CT scan in the ER revealed diffuse gastric wall thickening, 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  · Possible metastasis with omental caking, pulmonary nodules, ventral abdominal wall nodule, right adrenal nodule, possible hepatic lesion, and lateral right ninth rib  · Gastroenterology consulted for upper endoscopy with biopsy  · EGD performed revealing 3 malignant appearing gastric ulcers, preliminary biopsies are suspicious for high-grade lymphoma  · Oncology consulted, appreciate input  · Patient is clinically stable for discharge. He will be scheduled for close follow-up with heme-onc.   He is to obtain an outpatient PET scan and will be scheduled for port placement  · IR consulted and performed abdominal wall biopsy yesterday  · Also perform paracentesis for approximately 4 L of ascites      Cirrhosis of liver with ascites ([address])  Assessment & Plan  · Appreciate GI input  · Patient is discharged on furosemide 40 mg daily and Aldactone 100 mg daily  · He is to follow-up with hematology    Chronic ITP (idiopathic thrombocytopenia) ([address])  Assessment & Plan  Previous records in 2016: Patient has a history of ITP  · Current platelet count normal    Hypercalcemia  Assessment & Plan  Corrected calcium on admission 13.4  · Most likely hypercalcemia of malignancy  · Without hyperreflexia on exam  · Received IV fluids  · Corrected calcium improved to 10.9 at time of discharge with the administration of a single dose of Zometa   · Monitor CMP on discharge  · The patient will maintain close follow-up with heme-onc    Tobacco abuse  Assessment & Plan  cessation counseled  · Nicotine patch offered    GERD (gastroesophageal reflux disease)  Assessment & Plan  Continue proton pump inhibitor  Patient was found to have 3 gastric ulcers with preliminary pathology concerning for high-grade lymphoma for which she will follow-up with heme-onc    Hypertension  Assessment & Plan  Spironolactone and Lasix added to regimen    COPD (chronic obstructive pulmonary disease) ([address])  Assessment & Plan  Without acute exacerbation  · Continue home inhalers      Medical Problems     Resolved Problems  Date Reviewed: 8/31/2023   None       Discharging Physician / Practitioner: Joshua Lewis MD  PCP: Bria Mclaughlin MD  Admission Date:   Admission Orders (From admission, onward)     Ordered        08/28/23 1407  INPATIENT ADMISSION  Once                      Discharge Date: 08/31/23    Consultations During Hospital Stay:  · Heme-onc, GI, IR    Procedures Performed:     DATE OF SERVICE:  8/29/23     PHYSICIAN(S):  Attending:   Mayela Harrell MD      Fellow:   Mellisa Ponce DO         INDICATION:  Metastatic cancer ([address]), Abnormal CT of the abdomen     POST-OP DIAGNOSIS:  See the impression below.     PREPROCEDURE:  Informed consent was obtained for the procedure, including sedation. Risks of perforation, hemorrhage, adverse drug reaction and aspiration were discussed. The patient was placed in the left lateral decubitus position.     Patient was explained about the risks and benefits of the procedure. Risks including but not limited to bleeding, infection, and perforation were explained in detail.  Also explained about less than 100% sensitivity with the exam and other alternatives.     PROCEDURE: EGD     DETAILS OF PROCEDURE:   Patient was taken to the procedure room where a time out was performed to confirm correct patient and correct procedure. The patient underwent monitored anesthesia care, which was administered by an anesthesia professional. The patient's blood pressure, heart rate, level of consciousness, respirations and oxygen were monitored throughout the procedure. The scope was advanced to the second part of the duodenum. Retroflexion was performed in the fundus. The patient experienced no blood loss. The procedure was not difficult. The patient tolerated the procedure well. There were no apparent adverse events.      ANESTHESIA INFORMATION:  ASA: III  Anesthesia Type: General     MEDICATIONS:  No administrations occurring from 0933 to 0950 on 08/29/23         FINDINGS:  • Grade B esophagitis with mucosal breaks measuring 5 mm or more not continuous between folds, covering less than 75% of the circumference  • Large, deep, irregular, malignant-appearing ulcer in the body of the stomach and greater curve of the stomach with flat pigmented spot (Jonathon IIC); performed cold forceps biopsy. Mass with ulceration was seen. Multiple biopsy specimens were taken. • Single large, deep, irregular, malignant-appearing ulcer in the body of the stomach and lesser curve of the stomach with clean base (Jonathon III); performed cold forceps biopsy. Mass with ulceration was seen. Multiple biopsy specimens were taken. • Large, deep, irregular, malignant-appearing ulcer in the fundus of the stomach with clean base (Jonathon III); performed cold forceps biopsy. Multiple biopsy specimens were taken.   • Patchy edematous and erythematous mucosa with erosion in the body of the stomach, incisura and antrum  • Performed forceps biopsies in the incisura and antrum to rule out H. pylori  • The duodenum appeared normal.        IR biopsy soft tissue/superficial   Final Result by Beverley Gonzalez MD (08/30 8491)   Impression: Ultrasound-guided biopsy of right abdominal wall mass, for core samples obtained. .      Plan: Follow-up on pathology results of mass biopsy, pending labs also on ascites. Signed, performed, and dictated by MAHI Dupont under the supervision of Dr. Haris Marti. Workstation performed: [id number]         IR INPATIENT Paracentesis   Final Result by Haris Marti MD (08/30 7591)   Therapeutic and diagnostic paracentesis. Signed, performed, and dictated by MAHI Dupont under the supervision of Dr. Haris Marti. Workstation performed: [id number]         CT chest abdomen pelvis w contrast   Final Result by Frederick Mooney MD (08/28 0934)      1. 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 2.  Cluster of nodules in the right upper lobe are concerning for pulmonary metastasis. 3.  Ventral abdominal wall nodule is new since 2016 and is concerning for metastasis. 4.  A right adrenal adenoma has slightly increased in size since October 10, 2016 and may represent a metastasis. 5.  Subcentimeter sclerotic focus in the lateral right 9th rib is indeterminate for metastasis. 6.  Hepatic cirrhosis. 7.  Possible hepatic lesion. Contrast-enhanced MRI can be considered for further assessment should it guide clinical management. 8.  Moderate volume ascites can be due to the metastatic disease and/or liver disease.          I personally discussed this study with EDE MANRIQUEZ on 8/28/2023 1:40 PM.            Workstation performed: [id number]         NM PET CT skull base to mid thigh    (Results Pending)   IR biopsy bone marrow    (Results Pending)   IR port placement    (Results Pending)   IR AMB Paracentesis    (Results Pending)         Significant Findings / Test Results:   Results from last 7 days   Lab Units 08/31/23  0456   WBC Thousand/uL 5.70   HEMOGLOBIN g/dL 11.7* HEMATOCRIT % 36.7   PLATELETS Thousands/uL 134*     Results from last 7 days   Lab Units 08/31/23  0456   SODIUM mmol/L 140   CHLORIDE mmol/L 107   CO2 mmol/L 21   BUN mg/dL 19   CREATININE mg/dL 1.14   CALCIUM mg/dL 9.6   ALK PHOS U/L 86   ALT U/L 31   AST U/L 55*         Test Results Pending at Discharge (will require follow up): · Biopsies     Outpatient Tests Requested:  · None    Complications:  None    Reason for Admission: abdominal pain    Hospital Course:   Arthur Mao is a 70 y.o. male patient who originally presented to the hospital on 8/28/2023 due to abdominal pain. Patient's work-up unfortunately revealed findings concerning for metastatic malignancy, please refer to above regarding findings and next steps of management. 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 His  CEA and CA 19-9 markers were also elevated. The patient remained hemodynamically stable and is directed for close follow-up with heme-onc for further work-up and treatment options. The patient also underwent IR abdominal wall biopsy and paracentesis with pathology pending at time of discharge. The patient was also found to have liver cirrhosis for which she was initiated on diuretic. GI evaluation. The patient was discharged in an improved and stable condition. He is to follow-up closely with heme-onc, GI and PCP. Return precautions were discussed, all questions were answered. Prescriptions for medications were electronically sent to patient's pharmacy. Please see above list of diagnoses and related plan for additional information. Condition at Discharge: stable    Discharge Day Visit / Exam:     Subjective: Patient reports feeling well and is requesting to be discharged home. He intends to follow closely with medical providers as recommended. He voices no complaints.   Verbalized understanding of his diagnoses. Vitals: Blood Pressure: 123/75 (08/31/23 1249)  Pulse: 100 (08/31/23 1249)  Temperature: 98.4 °F (36.9 °C) (08/31/23 1131)  Temp Source: Oral (08/30/23 2215)  Respirations: 16 (08/31/23 1131)  Height: 5' 5" (165.1 cm) (08/28/23 1530)  Weight - Scale: 110 kg (243 lb 6.2 oz) (08/31/23 0600)  SpO2: 95 % (08/31/23 1249)    Exam:     Physical Exam  Constitutional:       General: He is not in acute distress. HENT:      Head: Normocephalic and atraumatic. Nose: No congestion. Eyes:      Conjunctiva/sclera: Conjunctivae normal.   Cardiovascular:      Rate and Rhythm: Regular rhythm. Tachycardia present. Heart sounds: No murmur heard. Pulmonary:      Effort: No respiratory distress. Breath sounds: No wheezing or rales. Abdominal:      General: There is distension. Palpations: Abdomen is soft. Tenderness: There is no abdominal tenderness. There is no guarding. Musculoskeletal:      Right lower leg: Edema (Trace pretibial bilateral) present. Left lower leg: Edema present. Skin:     General: Skin is warm and dry. Neurological:      Mental Status: He is oriented to person, place, and time. Psychiatric:         Mood and Affect: Mood normal.            Discharge instructions/Information to patient and family:   See after visit summary for information provided to patient and family. Provisions for Follow-Up Care:  See after visit summary for information related to follow-up care and any pertinent home health orders. Disposition:   Home    Planned Readmission: No     Discharge Statement:  I spent 45 minutes discharging the patient. This time was spent on the day of discharge. I had direct contact with the patient on the day of discharge. Greater than 50% of the total time was spent examining patient, answering all patient questions, arranging and discussing plan of care with patient as well as directly providing post-discharge instructions.   Additional time then spent on discharge activities. Discharge Medications:  See after visit summary for reconciled discharge medications provided to patient and/or family.       **Please Note: This note may have been constructed using a voice recognition system**

## 2023-08-31 NOTE — NURSING NOTE
Paperwork reviewed with patient and spouse. All questions and concerns answered. IV and masimo removed. Patient transported to car via wheelchair by RN and spouse with belongings.

## 2023-08-31 NOTE — PLAN OF CARE
Problem: Potential for Falls  Goal: Patient will remain free of falls  Description: INTERVENTIONS:  - Educate patient/family on patient safety including physical limitations  - Instruct patient to call for assistance with activity   - Consult OT/PT to assist with strengthening/mobility   - Keep Call bell within reach  - Keep bed low and locked with side rails adjusted as appropriate  - Keep care items and personal belongings within reach  - Initiate and maintain comfort rounds  - Make Fall Risk Sign visible to staff  - Apply yellow socks and bracelet for high fall risk patients  - Consider moving patient to room near nurses station  Outcome: Progressing     Problem: MOBILITY - ADULT  Goal: Maintain or return to baseline ADL function  Description: INTERVENTIONS:  -  Assess patient's ability to carry out ADLs; assess patient's baseline for ADL function and identify physical deficits which impact ability to perform ADLs (bathing, care of mouth/teeth, toileting, grooming, dressing, etc.)  - Assess/evaluate cause of self-care deficits   - Assess range of motion  - Assess patient's mobility; develop plan if impaired  - Assess patient's need for assistive devices and provide as appropriate  - Encourage maximum independence but intervene and supervise when necessary  - Involve family in performance of ADLs  - Assess for home care needs following discharge   - Consider OT consult to assist with ADL evaluation and planning for discharge  - Provide patient education as appropriate  Outcome: Progressing  Goal: Maintains/Returns to pre admission functional level  Description: INTERVENTIONS:  - Perform BMAT or MOVE assessment daily.   - Set and communicate daily mobility goal to care team and patient/family/caregiver. - Collaborate with rehabilitation services on mobility goals if consulted  - Perform Range of Motion 3 times a day. - Reposition patient every 2 hours.   - Dangle patient 3 times a day  - Stand patient 3 times a day  - Ambulate patient 3 times a day  - Out of bed to chair 3 times a day   - Out of bed for meals 3 times a day  - Out of bed for toileting  - Record patient progress and toleration of activity level   Outcome: Progressing     Problem: PAIN - ADULT  Goal: Verbalizes/displays adequate comfort level or baseline comfort level  Description: Interventions:  - Encourage patient to monitor pain and request assistance  - Assess pain using appropriate pain scale  - Administer analgesics based on type and severity of pain and evaluate response  - Implement non-pharmacological measures as appropriate and evaluate response  - Consider cultural and social influences on pain and pain management  - Notify physician/advanced practitioner if interventions unsuccessful or patient reports new pain  Outcome: Progressing     Problem: INFECTION - ADULT  Goal: Absence or prevention of progression during hospitalization  Description: INTERVENTIONS:  - Assess and monitor for signs and symptoms of infection  - Monitor lab/diagnostic results  - Monitor all insertion sites, i.e. indwelling lines, tubes, and drains  - Monitor endotracheal if appropriate and nasal secretions for changes in amount and color  - [place] appropriate cooling/warming therapies per order  - Administer medications as ordered  - Instruct and encourage patient and family to use good hand hygiene technique  - Identify and instruct in appropriate isolation precautions for identified infection/condition  Outcome: Progressing  Goal: Absence of fever/infection during neutropenic period  Description: INTERVENTIONS:  - Monitor WBC    Outcome: Progressing

## 2023-08-31 NOTE — ASSESSMENT & PLAN NOTE
Patient is a 53-year-old male with past medical history significant for GERD, COPD, hypertension, ITP, and pipe use who presented to the ER for evaluation of abdominal pain, n/v, early satiety, bloating and unintentional 14lb weight loss over past 2 months  · Unfortunately, CT scan in the ER revealed diffuse gastric wall thickening, 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  · Possible metastasis with omental caking, pulmonary nodules, ventral abdominal wall nodule, right adrenal nodule, possible hepatic lesion, and lateral right ninth rib  · Gastroenterology consulted for upper endoscopy with biopsy  · EGD performed revealing 3 malignant appearing gastric ulcers, preliminary biopsies are suspicious for high-grade lymphoma  · Oncology consulted, appreciate input  · Patient is clinically stable for discharge. He will be scheduled for close follow-up with heme-onc.   He is to obtain an outpatient PET scan and will be scheduled for port placement  · IR consulted and performed abdominal wall biopsy yesterday  · Also perform paracentesis for approximately 4 L of ascites

## 2023-08-31 NOTE — ASSESSMENT & PLAN NOTE
Corrected calcium on admission 13.4  · Most likely hypercalcemia of malignancy  · Without hyperreflexia on exam  · Received IV fluids  · Corrected calcium improved to 10.9 at time of discharge with the administration of a single dose of Zometa   · Monitor CMP on discharge  · The patient will maintain close follow-up with heme-onc

## 2023-08-31 NOTE — PROGRESS NOTES
Progress Note -  [facility]  Saima Berg. 70 y.o. male MRN: [id number]  Unit/Bed#: [place] [address] -01 Encounter: [id number]      ASSESSMENT AND PLAN:      57-year-old male with past medical history of ITP, GERD, asthma, hypertension, tobacco abuse who is admitted to the hospital for failure to thrive, unintentional weight loss. GI is consulted due to abnormal CAT scan suggestive of gastric cancer.     1. Abnormal CT scan  2. Abdominal distention  3. Unintentional weight loss  4. Early satiety  5. Dysphagia  6. Hypercalcemia  Sensation is highly concerning for malignancy particularly gastric cancer given CT findings. Also has evidence of diffuse metastasis. More than 5 years ago which was reported as normal per patient, no report available. Has macrocytic anemia likely due to malignancy. Hemoglobin is 11.7. Not appear clinically obstructed at this point. Elevated AST and mild hyperbilirubinemia is likely secondary to liver metastasis. EGD performed this admission showed 3 large ulcerated masses in the stomach. Multiple biopsies were taken. Consistent with lymphoma. • Follow-up with oncology as an outpatient. • Diet as tolerated. • IV fluids per primary team.  • Continue Protonix 40 mg twice daily for 8 weeks. • Continue Pepcid 20 mg nightly. • Outpatient hepatology follow-up. • Pain control per primary team.  • As needed Zofran for nausea. • Monitor LFTs, CBC daily.     7. Elevated LFTs  8. Cirrhosis  9. Ascites  10. Abnormal paracentesis studies. Meld currently 11. Etiology is likely Orien Blander as he denies any significant alcohol use. Has thrombocytopenia and low albumin consistent with cirrhosis. CT findings also show nodular hepatic contours and moderate ascites. Ascites fluid studies show high SAG, low protein consistent with cirrhosis. · Secondary work-up for cirrhosis as an outpatient. · Monitor CBC, CMP, INR daily. Ascites: Had moderate ascites on presentation. Underwent 4.4 L paracentesis this admission. High SAG low protein consistent with cirrhosis. Also had elevated WBC count of 6000's. This is likely secondary to peritoneal carcinomatosis. Culture and Gram stain negative. PMNs 150. Differential shows high malignant %. · Hold off on Abx  · 2 g sodium diet  · Given stable creatinine can trial lasix 40 mg, spironolactone 100 mg  · Recheck CMP in 1 week. · Outpatient paracentesis ordered. · Monitor monitor renal function. Varices: No varices on recent EGD  · Monitor for bleeding  · Repeat EGD in 2 years pending clinical course. Encephalopathy: No encephalopathy on presentation. · Monitor mental status  HCC: Has 2 liver lesions on imaging this admission. Has gastric biopsies positive for lymphoma. Unclear if liver lesions are related, but this is likely. · Follow up oncology. Transplant candidacy: Not a transplant candidate due to uncontrolled metastatic malignancy, age, other comorbidities. MELD 3.0: 11 at 8/31/2023  4:56 AM  MELD-Na: 9 at 8/31/2023  4:56 AM  Calculated from:  Serum Creatinine: 1.14 mg/dL at 8/31/2023  4:56 AM  Serum Sodium: 140 mmol/L (Using max of 137 mmol/L) at 8/31/2023  4:56 AM  Total Bilirubin: 1.02 mg/dL at 8/31/2023  4:56 AM  Serum Albumin: 2.4 g/dL at 8/31/2023  4:56 AM  INR(ratio): 1.21 at 8/29/2023 11:37 AM  Age at listing (hypothetical): 70 years  Sex: Male at 8/31/2023  4:56 AM    Stable from GI perspective for discharge. Gastroenterology to sign off at this time. Please contact with any questions. Rest of care per primary team.    ______________________________________________________________________    Subjective: Seen and examined. Feeling well. Ascites is still persistent. Denies any significant nausea, vomiting, diarrhea. REVIEW OF SYSTEMS:    Review of Systems   Constitutional: Negative for chills and fever. HENT: Negative for congestion and sinus pressure.     Respiratory: Negative for cough and shortness of breath. Cardiovascular: Negative for chest pain, palpitations and leg swelling. Gastrointestinal: Positive for abdominal distention. Negative for abdominal pain, diarrhea, nausea and vomiting. Genitourinary: Negative for dysuria and hematuria. Musculoskeletal: Negative for arthralgias and back pain. Skin: Negative for color change and rash. Neurological: Negative for dizziness and headaches. Psychiatric/Behavioral: Negative for agitation and confusion. All other systems reviewed and are negative.          Historical Information   Past Medical History:   Diagnosis Date   • Acute right flank pain    • Arthritis     generalized   • Asthma     uses inhaler   • Back pain    • Cellulitis    • Chronic ITP (idiopathic thrombocytopenia) ([facility])    • Chronic kidney disease     stones   • COPD (chronic obstructive pulmonary disease) ([facility])    • Full dentures    • GERD (gastroesophageal reflux disease)    • Gout    • Hearing difficulty of both ears    • Hypertension    • ITP (idiopathic thrombocytopenic purpura)    • Lumbar disc herniation    • Renal mass    • Spinal stenosis of lumbar region    • Tobacco abuse    • Wears glasses      Past Surgical History:   Procedure Laterality Date   • CARPAL TUNNEL RELEASE Bilateral    • CHOLECYSTECTOMY     • FINGER SURGERY Right     index finger   • IR BIOPSY SOFT TISSUE/SUPERFICIAL  8/29/2023   • IR PARACENTESIS  8/29/2023   • LUMBAR LAMINECTOMY      spinal stenosis    • CA CYSTO/URETERO W/LITHOTRIPSY &INDWELL STENT INSRT Right 11/9/2016    Procedure: CYSTOSCOPY URETEROSCOPY WITH LITHOTRIPSY, RETROGRADE PYELOGRAM; BASKET STONE EXTRACTION ;  Surgeon: Stiven Alicea MD;  Location: [facility];  Service: Urology   • CA CYSTO/URETERO W/LITHOTRIPSY &INDWELL STENT INSRT Right 9/28/2016    Procedure: CYSTOSCOPY, URETEROSCOPY, DILATION  OF URETERAL STRICTURE, RETROGRADE PYELOGRAM, AND INSERTION STENT URETERAL;  Surgeon: Stiven Alicea MD;  Location: [facility];  Service: Urology     Social History   Social History     Substance and Sexual Activity   Alcohol Use Yes   • Alcohol/week: 1.0 standard drink of alcohol   • Types: 1 Cans of beer per week     Social History     Substance and Sexual Activity   Drug Use No     Social History     Tobacco Use   Smoking Status Every Day   • Types: Pipe   Smokeless Tobacco Never   Tobacco Comments    pipe once a day     History reviewed. No pertinent family history.     Meds/Allergies     Medications Prior to Admission   Medication   • acetaminophen (TYLENOL) 325 mg tablet   • amLODIPine (NORVASC) 5 mg tablet   • celecoxib (CeleBREX) 100 mg capsule   • cyanocobalamin (VITAMIN B-12) 500 mcg tablet   • famotidine (PEPCID) 20 mg tablet   • omeprazole (PRILOSEC) 20 mg delayed release capsule   • traMADol (ULTRAM) 50 mg tablet   • allopurinol (ZYLOPRIM) 100 mg tablet   • budesonide-formoterol (SYMBICORT) 160-4.5 mcg/act inhaler   • Cholecalciferol 50 MCG (2000 UT) CAPS   • Colchicine 0.6 MG CAPS   • Fish Oil OIL   • oxybutynin (DITROPAN-XL) 5 mg 24 hr tablet   • tamsulosin (FLOMAX) 0.4 mg     Current Facility-Administered Medications   Medication Dose Route Frequency   • allopurinol (ZYLOPRIM) tablet 100 mg  100 mg Oral Daily   • budesonide-formoterol (SYMBICORT) 160-4.5 mcg/act inhaler 2 puff  2 puff Inhalation BID   • cyanocobalamin (VITAMIN B-12) tablet 500 mcg  500 mcg Oral Daily   • docusate sodium (COLACE) capsule 100 mg  100 mg Oral BID   • famotidine (PEPCID) tablet 20 mg  20 mg Oral HS   • heparin (porcine) subcutaneous injection 5,000 Units  5,000 Units Subcutaneous Q8H [address]   • HYDROmorphone HCl (DILAUDID) injection 0.2 mg  0.2 mg Intravenous Q4H PRN   • naloxone (NARCAN) 0.04 mg/mL syringe 0.04 mg  0.04 mg Intravenous Q1MIN PRN   • ondansetron (ZOFRAN) injection 4 mg  4 mg Intravenous Q6H PRN   • oxyCODONE (ROXICODONE) IR tablet 5 mg  5 mg Oral Q4H PRN   • oxyCODONE (ROXICODONE) split tablet 2.5 mg  2.5 mg Oral Q4H PRN   • pantoprazole (PROTONIX) EC tablet 40 mg  40 mg Oral BID AC   • senna-docusate sodium (SENOKOT S) 8.6-50 mg per tablet 1 tablet  1 tablet Oral HS   • sodium chloride 0.9 % infusion  100 mL/hr Intravenous Continuous   • traMADol (ULTRAM) tablet 50 mg  50 mg Oral Q6H PRN       Allergies   Allergen Reactions   • Codeine GI Intolerance     Nausea and bleeding   • Atorvastatin Hives   • Fenofibrate Hives     Tricor causes muscle pain   • Hydrochlorothiazide Hives   • Prednisone Hives and Other (See Comments)     sensitivity             Objective     Blood pressure 123/66, pulse (!) 107, temperature 98.9 °F (37.2 °C), temperature source Oral, resp. rate 18, height 5' 5" (1.651 m), weight 110 kg (243 lb 6.2 oz), SpO2 90 %. Body mass index is 40.5 kg/m². Intake/Output Summary (Last 24 hours) at 8/31/2023 0723  Last data filed at 8/31/2023 0132  Gross per 24 hour   Intake 1186 ml   Output --   Net 1186 ml         PHYSICAL EXAM:      Physical Exam  Vitals and nursing note reviewed. Constitutional:       General: He is not in acute distress. Appearance: Normal appearance. He is well-developed. He is not ill-appearing. HENT:      Head: Normocephalic and atraumatic. Mouth/Throat:      Mouth: Mucous membranes are moist.   Eyes:      Extraocular Movements: Extraocular movements intact. Conjunctiva/sclera: Conjunctivae normal.   Cardiovascular:      Rate and Rhythm: Normal rate. Pulses: Normal pulses. Pulmonary:      Effort: Pulmonary effort is normal.   Abdominal:      General: Abdomen is flat. Bowel sounds are normal. There is distension. Palpations: Abdomen is soft. Tenderness: There is no abdominal tenderness. There is no guarding. Musculoskeletal:      Cervical back: Neck supple. Right lower leg: No edema. Left lower leg: No edema. Skin:     General: Skin is warm and dry. Neurological:      General: No focal deficit present.       Mental Status: He is alert and oriented to person, place, and time.    Psychiatric:         Mood and Affect: Mood normal.         Behavior: Behavior normal.          Lab Results:   Admission on 08/28/2023   Component Date Value   • WBC 08/28/2023 8.86    • RBC 08/28/2023 4.25    • Hemoglobin 08/28/2023 13.9    • Hematocrit 08/28/2023 42.6    • MCV 08/28/2023 100 (H)    • MCH 08/28/2023 32.7    • MCHC 08/28/2023 32.6    • RDW 08/28/2023 15.0    • MPV 08/28/2023 10.0    • Platelets 44/23/9179 196    • nRBC 08/28/2023 0    • Neutrophils Relative 08/28/2023 70    • Immat GRANS % 08/28/2023 1    • Lymphocytes Relative 08/28/2023 15    • Monocytes Relative 08/28/2023 10    • Eosinophils Relative 08/28/2023 3    • Basophils Relative 08/28/2023 1    • Neutrophils Absolute 08/28/2023 6.35    • Immature Grans Absolute 08/28/2023 0.04    • Lymphocytes Absolute 08/28/2023 1.30    • Monocytes Absolute 08/28/2023 0.90    • Eosinophils Absolute 08/28/2023 0.22    • Basophils Absolute 08/28/2023 0.05    • hs TnI 0hr 08/28/2023 7    • Color, UA 08/28/2023 Light Yellow    • Clarity, UA 08/28/2023 Clear    • Specific Gravity, UA 08/28/2023 >=1.050 (H)    • pH, UA 08/28/2023 6.0    • Leukocytes, UA 08/28/2023 Negative    • Nitrite, UA 08/28/2023 Negative    • Protein, UA 08/28/2023 Negative    • Glucose, UA 08/28/2023 Negative    • Ketones, UA 08/28/2023 Negative    • Urobilinogen, UA 08/28/2023 <2.0    • Bilirubin, UA 08/28/2023 Negative    • Occult Blood, UA 08/28/2023 Negative    • Sodium 08/28/2023 139    • Potassium 08/28/2023 4.3    • Chloride 08/28/2023 103    • CO2 08/28/2023 24    • ANION GAP 08/28/2023 12    • BUN 08/28/2023 22    • Creatinine 08/28/2023 1.31 (H)    • Glucose 08/28/2023 100    • Calcium 08/28/2023 12.6 (HH)    • Corrected Calcium 08/28/2023 13.4 (HH)    • AST 08/28/2023 66 (H)    • ALT 08/28/2023 37    • Alkaline Phosphatase 08/28/2023 125 (H)    • Total Protein 08/28/2023 6.4    • Albumin 08/28/2023 3.0 (L)    • Total Bilirubin 08/28/2023 1.13 (H)    • eGFR 08/28/2023 52    • hs TnI 2hr 08/28/2023 6    • Delta 2hr hsTnI 08/28/2023 -1    • Ventricular Rate 08/28/2023 86    • Atrial Rate 08/28/2023 90    • FL Interval 08/28/2023 164    • QRSD Interval 08/28/2023 86    • QT Interval 08/28/2023 344    • QTC Interval 08/28/2023 411    • P Axis 08/28/2023 44    • QRS Axis 08/28/2023 0    • T Wave Axis 08/28/2023 -1    • Ventricular Rate 08/28/2023 87    • Atrial Rate 08/28/2023 87    • FL Interval 08/28/2023 178    • QRSD Interval 08/28/2023 88    • QT Interval 08/28/2023 334    • QTC Interval 08/28/2023 401    • P Axis 08/28/2023 47    • QRS Cost 08/28/2023 -11    • T Wave Axis 08/28/2023 8    • Sodium 08/29/2023 139    • Potassium 08/29/2023 4.1    • Chloride 08/29/2023 105    • CO2 08/29/2023 23    • ANION GAP 08/29/2023 11    • BUN 08/29/2023 22    • Creatinine 08/29/2023 1.21    • Glucose 08/29/2023 63 (L)    • Calcium 08/29/2023 10.8 (H)    • eGFR 08/29/2023 57    • WBC 08/29/2023 6.21    • RBC 08/29/2023 3.60 (L)    • Hemoglobin 08/29/2023 11.7 (L)    • Hematocrit 08/29/2023 36.2 (L)    • MCV 08/29/2023 101 (H)    • MCH 08/29/2023 32.5    • MCHC 08/29/2023 32.3    • RDW 08/29/2023 14.8    • Platelets 23/39/0716 151    • MPV 08/29/2023 9.8    • Calcium, Ionized 08/29/2023 1.43 (H)    • Vitamin B-12 08/29/2023 1,582 (H)    • Folate 08/29/2023 6.5    • TSH 3RD GENERATON 08/29/2023 4. 171    • CA 19-9 08/29/2023 55 (H)    • CEA 08/29/2023 5.5 (H)    • Sodium 08/29/2023 139    • Potassium 08/29/2023 4.1    • Chloride 08/29/2023 105    • CO2 08/29/2023 23    • ANION GAP 08/29/2023 11    • BUN 08/29/2023 22    • Creatinine 08/29/2023 1.21    • Glucose 08/29/2023 63 (L)    • Calcium 08/29/2023 10.8 (H)    • Corrected Calcium 08/29/2023 12.0 (H)    • AST 08/29/2023 53 (H)    • ALT 08/29/2023 31    • Alkaline Phosphatase 08/29/2023 101    • Total Protein 08/29/2023 5.3 (L)    • Albumin 08/29/2023 2.5 (L)    • Total Bilirubin 08/29/2023 1.23 (H)    • eGFR 08/29/2023 57    • POC Glucose 08/29/2023 71    • Case Report 08/29/2023                      Value:Surgical Pathology Report                         Case: [id number]                                   Authorizing Provider:  Leeann Ronquillo DO      Collected:           08/29/2023 2792              Ordering Location:     [facility]        Received:            08/29/2023 [place] [address]                                                            Pathologist:           Jonathon Pina MD                                                            Specimens:   A) - Stomach, body, ulcerated mass r/o malignancy STAT                                              B) - Stomach, body, ulcerated mass r/o malignancy STAT                                              C) - Stomach, fundus, ulcerated mass STAT                                                           D) - Stomach, antrum and incinsura bx r/o malignancy STAT                                 • Preliminary Diagnosis 08/29/2023                      Value: This result contains rich text formatting which cannot be displayed here. • Additional Information 08/29/2023                      Value: This result contains rich text formatting which cannot be displayed here. • Gross Description 08/29/2023                      Value: This result contains rich text formatting which cannot be displayed here.    • Protime 08/29/2023 15.3 (H)    • INR 08/29/2023 1.21 (H)    • Iron Saturation 08/29/2023 23    • TIBC 08/29/2023 265    • Iron 08/29/2023 60    • UIBC 08/29/2023 205    • Ferritin 08/29/2023 69    • Site 08/29/2023 Paracentesis    • Color, Fluid 08/29/2023 Yellow    • Clarity, Fluid 08/29/2023 Cloudy (A)    • WBC, Fluid 08/29/2023 6,264    • Body Fluid Culture, Ster* 08/29/2023 No growth    • Gram Stain Result 08/29/2023 3+ Polys    • Gram Stain Result 08/29/2023 No bacteria seen    • Protein, Fluid 08/29/2023 <3.0    • Glucose, Fluid 08/29/2023 21 • Albumin, Fluid 08/29/2023 <1.5    • LD, Fluid 08/29/2023 2,030    • WBC 08/30/2023 5.28    • RBC 08/30/2023 3.62 (L)    • Hemoglobin 08/30/2023 11.9 (L)    • Hematocrit 08/30/2023 36.1 (L)    • MCV 08/30/2023 100 (H)    • MCH 08/30/2023 32.9    • MCHC 08/30/2023 33.0    • RDW 08/30/2023 15.1    • Platelets 40/69/3237 135 (L)    • MPV 08/30/2023 10.6    • Sodium 08/30/2023 139    • Potassium 08/30/2023 3.7    • Chloride 08/30/2023 107    • CO2 08/30/2023 21    • ANION GAP 08/30/2023 11    • BUN 08/30/2023 21    • Creatinine 08/30/2023 1.18    • Glucose 08/30/2023 64 (L)    • Calcium 08/30/2023 10.2    • Corrected Calcium 08/30/2023 11.5 (H)    • AST 08/30/2023 48 (H)    • ALT 08/30/2023 29    • Alkaline Phosphatase 08/30/2023 92    • Total Protein 08/30/2023 5.2 (L)    • Albumin 08/30/2023 2.4 (L)    • Total Bilirubin 08/30/2023 1.11 (H)    • eGFR 08/30/2023 59    • WBC 08/31/2023 5.70    • RBC 08/31/2023 3.62 (L)    • Hemoglobin 08/31/2023 11.7 (L)    • Hematocrit 08/31/2023 36.7    • MCV 08/31/2023 101 (H)    • MCH 08/31/2023 32.3    • MCHC 08/31/2023 31.9    • RDW 08/31/2023 15.1    • Platelets 53/12/9098 134 (L)    • MPV 08/31/2023 10.4    • Sodium 08/31/2023 140    • Potassium 08/31/2023 3.7    • Chloride 08/31/2023 107    • CO2 08/31/2023 21    • ANION GAP 08/31/2023 12    • BUN 08/31/2023 19    • Creatinine 08/31/2023 1.14    • Glucose 08/31/2023 69    • Calcium 08/31/2023 9.6    • Corrected Calcium 08/31/2023 10.9 (H)    • AST 08/31/2023 55 (H)    • ALT 08/31/2023 31    • Alkaline Phosphatase 08/31/2023 86    • Total Protein 08/31/2023 5.1 (L)    • Albumin 08/31/2023 2.4 (L)    • Total Bilirubin 08/31/2023 1.02 (H)    • eGFR 08/31/2023 62        Imaging Studies: I have personally reviewed pertinent imaging studies. [address]uki Mckeon D.O.   Gastroenterology Fellow  PGY-5  Available via [employer]  8/31/2023 7:23 AM

## 2023-08-31 NOTE — ASSESSMENT & PLAN NOTE
· Appreciate GI input  · Patient is discharged on furosemide 40 mg daily and Aldactone 100 mg daily  · He is to follow-up with hematology

## 2023-08-31 NOTE — ASSESSMENT & PLAN NOTE
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

## 2023-08-31 NOTE — PLAN OF CARE
Problem: Potential for Falls  Goal: Patient will remain free of falls  Description: INTERVENTIONS:  - Educate patient/family on patient safety including physical limitations  - Instruct patient to call for assistance with activity   - Consult OT/PT to assist with strengthening/mobility   - Keep Call bell within reach  - Keep bed low and locked with side rails adjusted as appropriate  - Keep care items and personal belongings within reach  - Initiate and maintain comfort rounds  - Make Fall Risk Sign visible to staff  - Offer Toileting every 2 Hours, in advance of need  - Initiate/Maintain bed alarm  - Obtain necessary fall risk management equipment  - Apply yellow socks and bracelet for high fall risk patients  - Consider moving patient to room near nurses station  Outcome: Progressing     Problem: MOBILITY - ADULT  Goal: Maintain or return to baseline ADL function  Description: INTERVENTIONS:  -  Assess patient's ability to carry out ADLs; assess patient's baseline for ADL function and identify physical deficits which impact ability to perform ADLs (bathing, care of mouth/teeth, toileting, grooming, dressing, etc.)  - Assess/evaluate cause of self-care deficits   - Assess range of motion  - Assess patient's mobility; develop plan if impaired  - Assess patient's need for assistive devices and provide as appropriate  - Encourage maximum independence but intervene and supervise when necessary  - Involve family in performance of ADLs  - Assess for home care needs following discharge   - Consider OT consult to assist with ADL evaluation and planning for discharge  - Provide patient education as appropriate  Outcome: Progressing  Goal: Maintains/Returns to pre admission functional level  Description: INTERVENTIONS:  - Perform BMAT or MOVE assessment daily.   - Set and communicate daily mobility goal to care team and patient/family/caregiver.    - Collaborate with rehabilitation services on mobility goals if consulted  - Perform Range of Motion 3 times a day. - Reposition patient every 2 hours.   - Dangle patient 3 times a day  - Stand patient 3 times a day  - Ambulate patient 3 times a day  - Out of bed to chair 3 times a day   - Out of bed for meals 3 times a day  - Out of bed for toileting  - Record patient progress and toleration of activity level   Outcome: Progressing     Problem: PAIN - ADULT  Goal: Verbalizes/displays adequate comfort level or baseline comfort level  Description: Interventions:  - Encourage patient to monitor pain and request assistance  - Assess pain using appropriate pain scale  - Administer analgesics based on type and severity of pain and evaluate response  - Implement non-pharmacological measures as appropriate and evaluate response  - Consider cultural and social influences on pain and pain management  - Notify physician/advanced practitioner if interventions unsuccessful or patient reports new pain  Outcome: Progressing     Problem: INFECTION - ADULT  Goal: Absence or prevention of progression during hospitalization  Description: INTERVENTIONS:  - Assess and monitor for signs and symptoms of infection  - Monitor lab/diagnostic results  - Monitor all insertion sites, i.e. indwelling lines, tubes, and drains  - Monitor endotracheal if appropriate and nasal secretions for changes in amount and color  - [place] appropriate cooling/warming therapies per order  - Administer medications as ordered  - Instruct and encourage patient and family to use good hand hygiene technique  - Identify and instruct in appropriate isolation precautions for identified infection/condition  Outcome: Progressing  Goal: Absence of fever/infection during neutropenic period  Description: INTERVENTIONS:  - Monitor WBC    Outcome: Progressing     Problem: SAFETY ADULT  Goal: Patient will remain free of falls  Description: INTERVENTIONS:  - Educate patient/family on patient safety including physical limitations  - Instruct patient to call for assistance with activity   - Consult OT/PT to assist with strengthening/mobility   - Keep Call bell within reach  - Keep bed low and locked with side rails adjusted as appropriate  - Keep care items and personal belongings within reach  - Initiate and maintain comfort rounds  - Make Fall Risk Sign visible to staff  - Offer Toileting every 2 Hours, in advance of need  - Initiate/Maintain bed alarm  - Obtain necessary fall risk management equipment  - Apply yellow socks and bracelet for high fall risk patients  - Consider moving patient to room near nurses station  Outcome: Progressing  Goal: Maintain or return to baseline ADL function  Description: INTERVENTIONS:  -  Assess patient's ability to carry out ADLs; assess patient's baseline for ADL function and identify physical deficits which impact ability to perform ADLs (bathing, care of mouth/teeth, toileting, grooming, dressing, etc.)  - Assess/evaluate cause of self-care deficits   - Assess range of motion  - Assess patient's mobility; develop plan if impaired  - Assess patient's need for assistive devices and provide as appropriate  - Encourage maximum independence but intervene and supervise when necessary  - Involve family in performance of ADLs  - Assess for home care needs following discharge   - Consider OT consult to assist with ADL evaluation and planning for discharge  - Provide patient education as appropriate  Outcome: Progressing  Goal: Maintains/Returns to pre admission functional level  Description: INTERVENTIONS:  - Perform BMAT or MOVE assessment daily.   - Set and communicate daily mobility goal to care team and patient/family/caregiver. - Collaborate with rehabilitation services on mobility goals if consulted  - Perform Range of Motion 3 times a day. - Reposition patient every 2 hours.   - Dangle patient 3 times a day  - Stand patient 3 times a day  - Ambulate patient 3 times a day  - Out of bed to chair 3 times a day   - Out of bed for meals 3 times a day  - Out of bed for toileting  - Record patient progress and toleration of activity level   Outcome: Progressing     Problem: DISCHARGE PLANNING  Goal: Discharge to home or other facility with appropriate resources  Description: INTERVENTIONS:  - Identify barriers to discharge w/patient and caregiver  - Arrange for needed discharge resources and transportation as appropriate  - Identify discharge learning needs (meds, wound care, etc.)  - Arrange for interpretive services to assist at discharge as needed  - Refer to Case Management Department for coordinating discharge planning if the patient needs post-hospital services based on physician/advanced practitioner order or complex needs related to functional status, cognitive ability, or social support system  Outcome: Progressing     Problem: Knowledge Deficit  Goal: Patient/family/caregiver demonstrates understanding of disease process, treatment plan, medications, and discharge instructions  Description: Complete learning assessment and assess knowledge base.   Interventions:  - Provide teaching at level of understanding  - Provide teaching via preferred learning methods  Outcome: Progressing     Problem: GASTROINTESTINAL - ADULT  Goal: Maintains adequate nutritional intake  Description: INTERVENTIONS:  - Monitor percentage of each meal consumed  - Identify factors contributing to decreased intake, treat as appropriate  - Assist with meals as needed  - Monitor I&O, weight, and lab values if indicated  - Obtain nutrition services referral as needed  Outcome: Progressing     Problem: METABOLIC, FLUID AND ELECTROLYTES - ADULT  Goal: Electrolytes maintained within normal limits  Description: INTERVENTIONS:  - Monitor labs and assess patient for signs and symptoms of electrolyte imbalances  - Administer electrolyte replacement as ordered  - Monitor response to electrolyte replacements, including repeat lab results as appropriate  - Instruct patient on fluid and nutrition as appropriate  Outcome: Progressing     Problem: SKIN/TISSUE INTEGRITY - ADULT  Goal: Skin Integrity remains intact(Skin Breakdown Prevention)  Description: Assess:  -Perform Patel assessment  -Clean and moisturize skin  -Inspect skin when repositioning, toileting, and assisting with ADLS  -Assess under medical devices   -Assess extremities for adequate circulation and sensation     Bed Management:  -Have minimal linens on bed & keep smooth, unwrinkled  -Change linens as needed when moist or perspiring  -Avoid sitting or lying in one position for more than 2 hours while in bed  -Keep HOB at 30 degrees     Toileting:  -Offer bedside commode  -Assess for incontinence  -Use incontinent care products after each incontinent episode    Activity:  -Mobilize patient 3 times a day  -Encourage activity and walks on unit  -Encourage or provide ROM exercises   -Turn and reposition patient every 2 Hours  -Use appropriate equipment to lift or move patient in bed  -Instruct/ Assist with weight shifting every when out of bed in chair  -Consider limitation of chair time 2 hour intervals    Skin Care:  -Avoid use of baby powder, tape, friction and shearing, hot water or constrictive clothing  -Relieve pressure over bony prominences  -Do not massage red bony areas    Next Steps:  -Teach patient strategies to minimize risks   -Consider consults to  interdisciplinary teams   Outcome: Progressing     Problem: HEMATOLOGIC - ADULT  Goal: Maintains hematologic stability  Description: INTERVENTIONS  - Assess for signs and symptoms of bleeding or hemorrhage  - Monitor labs  - Administer supportive blood products/factors as ordered and appropriate  Outcome: Progressing     Problem: COPING  Goal: Pt/Family able to verbalize concerns and demonstrate effective coping strategies  Description: INTERVENTIONS:  - Assist patient/family to identify coping skills, available support systems and cultural and spiritual values  - Provide emotional support, including active listening and acknowledgement of concerns of patient and caregivers  - Reduce environmental stimuli, as able  - Provide patient education  - Assess for spiritual pain/suffering and initiate spiritual care, including notification of Pastoral Care or eda based community as needed  - Assess effectiveness of coping strategies  Outcome: Progressing  Goal: Will report anxiety at manageable levels  Description: INTERVENTIONS:  - Administer medication as ordered  - Teach and encourage coping skills  - Provide emotional support  - Assess patient/family for anxiety and ability to cope  Outcome: Progressing     Problem: Nutrition/Hydration-ADULT  Goal: Nutrient/Hydration intake appropriate for improving, restoring or maintaining nutritional needs  Description: Monitor and assess patient's nutrition/hydration status for malnutrition. Collaborate with interdisciplinary team and initiate plan and interventions as ordered. Monitor patient's weight and dietary intake as ordered or per policy. Utilize nutrition screening tool and intervene as necessary. Determine patient's food preferences and provide high-protein, high-caloric foods as appropriate.      INTERVENTIONS:  - Monitor oral intake, urinary output, labs, and treatment plans  - Assess nutrition and hydration status and recommend course of action  - Evaluate amount of meals eaten  - Assist patient with eating if necessary   - Allow adequate time for meals  - Recommend/ encourage appropriate diets, oral nutritional supplements, and vitamin/mineral supplements  - Order, calculate, and assess calorie counts as needed  - Recommend, monitor, and adjust tube feedings and TPN/PPN based on assessed needs  - Assess need for intravenous fluids  - Provide specific nutrition/hydration education as appropriate  - Include patient/family/caregiver in decisions related to nutrition  Outcome: Progressing

## 2023-08-31 NOTE — PROGRESS NOTES
Medical Oncology/Hematology Progress Note  Pasha Pike Sr., male, 70 y. o., 1945,  South 2 /South 2 Rui Mondragon*, [id number]     Patient is a 72-year-old male with PMH of hypertension, GERD, COPD, chronic ITP (no hematology records available on [facility]) , and tobacco use. He presented to the ER for evaluation of abdominal pain that has been chronically ongoing for the past two months. Along with abdominal pain, he was experiencing intermittent nausea and vomiting, loss of appetite, early satiety and unintentional 14-lb weight loss over past 2 months. In addition, he was having generalized weakness and fatigue. He drinks alcohol occasionally. Patient reported that he had an EGD done about 5 years ago that was unremarkable. 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 GI was consulted to perform tissue procurement for diagnosis/possible endoscopy. His CMP showed hypercalcemia as well. CT imaging showed a subcentimeter sclerotic focus in the lateral right 9th rib which is indeterminate for metastasis. He is status post EGD on 8/29/2023 that preliminarily showed suspicious for high-grade lymphoma. Immunostains pending. He is also status post IR abdominal wall biopsy, awaiting results as well. Assessment and Plan:  1. 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 · Cluster of nodules in the right upper lobe are concerning for pulmonary metastasis. ·  Ventral abdominal wall nodule is new since 2016 and is concerning for metastasis. · Right adrenal adenoma has slightly increased in size since October 2016 and may represent a metastasis. · Subcentimeter sclerotic focus in the lateral right 9th rib is indeterminate for metastasis. ·  Hepatic cirrhosis. Possible hepatic lesion    Tumor markers:  -CEA (8/29/23)- 5.5  -CA 19-9 (8/29/23)- 55    2. Hypercalcemia  -Corrected calcium 10.9 (8/31/23) <11.5 <12 <13.4  -malignancy-induced vs. Decreased oral intake vs. both  -CT imaging showed bone involvement with possible metastasis. 3. Hx of Chronic ITP  -Not evident at this time    PLAN:  -s/p EGD on 8/29/23 with GI. Preliminary biopsy results showed suspicious for high-grade lymphoma. Immuno-stains pending.  -Awaiting IR biopsy results on abdominal wall    -Added LDH and uric acid from today's labs  -Patient is s/p one dose of Zometa. Corrected calcium improving. Of note, 10.9 from 13.4 mg/dL. Continue in encouraging oral hydration.  -Agree with discharge to home. In light of preliminary biopsy results from his EGD, will order outpatient PET scan, IR consult for port placement and bone marrow biopsy to r/o bone invasion. Discussed with primary team.  -Outpatient follow up plan: Appointment with Dr. Flaco Lucero on 9/11/2023    LABS:  CMP (8/28/23)  -alkaline phosphatase 86 < 92 <125  -Total bilirubin 1.02 <1.11 <1.13  -chronic elevation of AST since 2021. Of note, 55 < 48 <66. -ALT 37    CBC (8/28/23)  WBC 5.7 WNL  Hemoglobin 11.7. MCV of 101, mild macrocytosis. Platelets 184    Folate 6.5  Vitamin B-12 1,582  Iron panel: saturation 23%, ferritin 69      Please do not hesitate to reach out for questions of concerns. Janet Larios, DNP, CRNP  Hematology-Oncology     Interval Hx:  Discussed the preliminary results from EGD showing suspicions for high-grade lymphoma. Aware of the plan for outpatient PET scan imaging, IR consult for port placement and bone marrow biopsy. Patient also verbalized understanding that he has a follow-up appointment with Dr. Prashanth Hernandez on 9/11/2023. Patient expressed understanding albeit with some anxiety, as expected. He was reassured that we will be following him every step of the way.     History of present illness:  Morales Punt  is a 70 y.o. male with PMH of hypertension, GERD, COPD, chronic ITP, and tobacco use. He presented to the ER for evaluation of abdominal pain that has been chronically ongoing for the past two months. Along with abdominal pain, he was experiencing intermittent nausea and vomiting, loss of appetite, early satiety and unintentional 14-lb weight loss over past 2 months. In addition, he was having generalized weakness and fatigue. He drinks alcohol occasionally. Patient reported that he had an EGD done about 5 years ago that was unremarkable. 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 GI was consulted to perform tissue procurement for diagnosis/possible endoscopy. His CMP showed hypercalcemia as well. CT imaging showed a subcentimeter sclerotic focus in the lateral right 9th rib which is indeterminate for metastasis. Oncology was consulted to offer recommendations regarding abnormal imaging. Review of Systems:   Review of Systems   Constitutional: Negative for chills and fever. HENT: Negative for ear pain and sore throat. Eyes: Negative for pain and visual disturbance. Respiratory: Negative for cough and shortness of breath. Cardiovascular: Negative for chest pain and palpitations. Gastrointestinal: Positive for abdominal distention. Negative for abdominal pain and vomiting. Genitourinary: Negative for dysuria and hematuria. Musculoskeletal: Negative for arthralgias and back pain. Skin: Negative for color change and rash. Neurological: Negative for seizures and syncope. All other systems reviewed and are negative. Oncology History:   Cancer Staging   No matching staging information was found for the patient. Oncology History    No history exists.        Past Medical History:   Past Medical History:   Diagnosis Date   • Acute right flank pain    • Arthritis generalized   • Asthma     uses inhaler   • Back pain    • Cellulitis    • Chronic ITP (idiopathic thrombocytopenia) ([facility])    • Chronic kidney disease     stones   • COPD (chronic obstructive pulmonary disease) ([facility])    • Full dentures    • GERD (gastroesophageal reflux disease)    • Gout    • Hearing difficulty of both ears    • Hypertension    • ITP (idiopathic thrombocytopenic purpura)    • Lumbar disc herniation    • Renal mass    • Spinal stenosis of lumbar region    • Tobacco abuse    • Wears glasses        Past Surgical History:   Procedure Laterality Date   • CARPAL TUNNEL RELEASE Bilateral    • CHOLECYSTECTOMY     • FINGER SURGERY Right     index finger   • IR BIOPSY SOFT TISSUE/SUPERFICIAL  8/29/2023   • IR PARACENTESIS  8/29/2023   • LUMBAR LAMINECTOMY      spinal stenosis    • WA CYSTO/URETERO W/LITHOTRIPSY &INDWELL STENT INSRT Right 11/9/2016    Procedure: CYSTOSCOPY URETEROSCOPY WITH LITHOTRIPSY, RETROGRADE PYELOGRAM; BASKET STONE EXTRACTION ;  Surgeon: Jie Doherty MD;  Location: [facility];  Service: Urology   • WA CYSTO/URETERO W/LITHOTRIPSY &INDWELL STENT INSRT Right 9/28/2016    Procedure: CYSTOSCOPY, URETEROSCOPY, DILATION  OF URETERAL STRICTURE, RETROGRADE PYELOGRAM, AND INSERTION STENT URETERAL;  Surgeon: Jie Doherty MD;  Location: [facility];  Service: Urology       History reviewed. No pertinent family history. 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   Tobacco Use   • Smoking status: Every Day     Types: Pipe   • Smokeless tobacco: Never   • Tobacco comments:     pipe once a day   Vaping Use   • Vaping Use: Never used   Substance and Sexual Activity   • Alcohol use: Yes     Alcohol/week: 1.0 standard drink of alcohol     Types: 1 Cans of beer per week   • Drug use:  No • Sexual activity: None   Other Topics Concern   • None   Social History Narrative   • None     Social Determinants of Health     Financial Resource Strain: Not on file   Food Insecurity: No Food Insecurity (8/30/2023)    Hunger Vital Sign    • Worried About Running Out of Food in the Last Year: Never true    • Ran Out of Food in the Last Year: Never true   Transportation Needs: No Transportation Needs (8/30/2023)    PRAPARE - Transportation    • Lack of Transportation (Medical): No    • Lack of Transportation (Non-Medical):  No   Physical Activity: Not on file   Stress: Not on file   Social Connections: Not on file   Intimate Partner Violence: Not on file   Housing Stability: Low Risk  (8/30/2023)    Housing Stability Vital Sign    • Unable to Pay for Housing in the Last Year: No    • Number of Places Lived in the Last Year: 1    • Unstable Housing in the Last Year: No         Current Facility-Administered Medications:   •  allopurinol (ZYLOPRIM) tablet 100 mg, 100 mg, Oral, Daily, Marli Isabel MD, 100 mg at 08/31/23 0944  •  budesonide-formoterol (SYMBICORT) 160-4.5 mcg/act inhaler 2 puff, 2 puff, Inhalation, BID, Marli Isabel MD, 2 puff at 08/31/23 0944  •  cyanocobalamin (VITAMIN B-12) tablet 500 mcg, 500 mcg, Oral, Daily, Marli Isabel MD, 500 mcg at 08/31/23 0944  •  docusate sodium (COLACE) capsule 100 mg, 100 mg, Oral, BID, Marli Isabel MD  •  famotidine (PEPCID) tablet 20 mg, 20 mg, Oral, HS, Marli Isabel MD, 20 mg at 08/30/23 2147  •  heparin (porcine) subcutaneous injection 5,000 Units, 5,000 Units, Subcutaneous, Q8H [address], 5,000 Units at 08/31/23 1831 **AND** Platelet count, , , Once, Marli Isabel MD  •  HYDROmorphone HCl (DILAUDID) injection 0.2 mg, 0.2 mg, Intravenous, Q4H PRN, Laney Painting PA-C  •  naloxone (NARCAN) 0.04 mg/mL syringe 0.04 mg, 0.04 mg, Intravenous, Q1MIN PRN, Laney Painting PA-C  •  ondansetron (ZOFRAN) injection 4 mg, 4 mg, Intravenous, Q6H PRN, Marli Isabel MD  • oxyCODONE (ROXICODONE) IR tablet 5 mg, 5 mg, Oral, Q4H PRN, Shara Colón PA-C  •  oxyCODONE (ROXICODONE) split tablet 2.5 mg, 2.5 mg, Oral, Q4H PRN, Shara Colón PA-C  •  pantoprazole (PROTONIX) EC tablet 40 mg, 40 mg, Oral, BID AC, Tito JAGDEEP Tobin DO, 40 mg at 08/31/23 4531  •  senna-docusate sodium (SENOKOT S) 8.6-50 mg per tablet 1 tablet, 1 tablet, Oral, HS, Shara Colón PA-C, 1 tablet at 08/30/23 2147  •  sodium chloride 0.9 % infusion, 100 mL/hr, Intravenous, Continuous, Shara Colón PA-C, Last Rate: 100 mL/hr at 08/31/23 0132, 100 mL/hr at 08/31/23 0132  •  traMADol (ULTRAM) tablet 50 mg, 50 mg, Oral, Q6H PRN, Angelina Rodriguez MD, 50 mg at 08/30/23 9500    Medications Prior to Admission   Medication   • acetaminophen (TYLENOL) 325 mg tablet   • amLODIPine (NORVASC) 5 mg tablet   • celecoxib (CeleBREX) 100 mg capsule   • cyanocobalamin (VITAMIN B-12) 500 mcg tablet   • famotidine (PEPCID) 20 mg tablet   • omeprazole (PRILOSEC) 20 mg delayed release capsule   • traMADol (ULTRAM) 50 mg tablet   • allopurinol (ZYLOPRIM) 100 mg tablet   • budesonide-formoterol (SYMBICORT) 160-4.5 mcg/act inhaler   • Cholecalciferol 50 MCG (2000 UT) CAPS   • Colchicine 0.6 MG CAPS   • Fish Oil OIL   • oxybutynin (DITROPAN-XL) 5 mg 24 hr tablet   • tamsulosin (FLOMAX) 0.4 mg       Allergies   Allergen Reactions   • Codeine GI Intolerance     Nausea and bleeding   • Atorvastatin Hives   • Fenofibrate Hives     Tricor causes muscle pain   • Hydrochlorothiazide Hives   • Prednisone Hives and Other (See Comments)     sensitivity           Physical Exam:     /71   Pulse 97   Temp 97.9 °F (36.6 °C)   Resp 16   Ht 5' 5" (1.651 m)   Wt 110 kg (243 lb 6.2 oz)   SpO2 92%   BMI 40.50 kg/m²     Physical Exam  HENT:      Head: Normocephalic. Mouth/Throat:      Mouth: Mucous membranes are moist.   Eyes:      Conjunctiva/sclera: Conjunctivae normal.   Cardiovascular:      Rate and Rhythm: Normal rate.       Pulses: Normal pulses. Heart sounds: Normal heart sounds. Pulmonary:      Effort: Pulmonary effort is normal.      Breath sounds: Normal breath sounds. Abdominal:      General: Bowel sounds are normal. There is distension. Musculoskeletal:         General: Normal range of motion. Skin:     General: Skin is warm and dry. Neurological:      General: No focal deficit present. Mental Status: He is alert and oriented to person, place, and time. Motor: Weakness present. Psychiatric:         Mood and Affect: Mood normal.         Behavior: Behavior normal.         Thought Content: Thought content normal.         Judgment: Judgment normal.         Recent Results (from the past 48 hour(s))   Protime-INR    Collection Time: 08/29/23 11:37 AM   Result Value Ref Range    Protime 15.3 (H) 11.6 - 14.5 seconds    INR 1.21 (H) 0.84 - 1.19   TIBC Panel (incl. Iron, TIBC, % Iron Saturation)    Collection Time: 08/29/23 11:56 AM   Result Value Ref Range    Iron Saturation 23 15 - 50 %    TIBC 265 250 - 450 ug/dL    Iron 60 50 - 212 ug/dL    UIBC 205 155 - 355 ug/dL   Ferritin    Collection Time: 08/29/23 11:56 AM   Result Value Ref Range    Ferritin 69 24 - 336 ng/mL   Body fluid white cell count with differential    Collection Time: 08/29/23  1:55 PM   Result Value Ref Range    Site Paracentesis     Color, Fluid Yellow Clear, Colorless,Yellow    Clarity, Fluid Cloudy (A) Clear    WBC, Fluid 6,264 /ul   Body fluid culture and Gram stain    Collection Time: 08/29/23  1:55 PM    Specimen: Paracentesis;  Body Fluid   Result Value Ref Range    Body Fluid Culture, Sterile No growth     Gram Stain Result 3+ Polys     Gram Stain Result No bacteria seen    Total Protein, body fluid    Collection Time: 08/29/23  1:55 PM   Result Value Ref Range    Protein, Fluid <3.0 g/dL   Glucose, body fluid    Collection Time: 08/29/23  1:55 PM   Result Value Ref Range    Glucose, Fluid 21 mg/dL   Albumin, fluid    Collection Time: 08/29/23  1:55 PM   Result Value Ref Range    Albumin, Fluid <1.5 g/dL   LD (LDH), Body Fluid    Collection Time: 08/29/23  1:55 PM   Result Value Ref Range    LD, Fluid 2,030 U/L   Body Fluid Diff    Collection Time: 08/29/23  1:55 PM   Result Value Ref Range    Total Counted 100     Neutrophils % (Fluid) 2 %    Lymphs % (Fluid) 1 %    Malignant % (FLuid) 97 %    Pathology Review Yes (A) No   Path Slide Review    Collection Time: 08/29/23  1:55 PM   Result Value Ref Range    Path Review       Malignant cells present. Correlate with concurrent cytology specimen ([id number]) and biopsy specimen ([id number], [id number]). Evaluated by Jaclyn Quintana M.D.   Interpretation performed at [place] [address]   CBC    Collection Time: 08/30/23  5:04 AM   Result Value Ref Range    WBC 5.28 4.31 - 10.16 Thousand/uL    RBC 3.62 (L) 3.88 - 5.62 Million/uL    Hemoglobin 11.9 (L) 12.0 - 17.0 g/dL    Hematocrit 36.1 (L) 36.5 - 49.3 %     (H) 82 - 98 fL    MCH 32.9 26.8 - 34.3 pg    MCHC 33.0 31.4 - 37.4 g/dL    RDW 15.1 11.6 - 15.1 %    Platelets 471 (L) 709 - 390 Thousands/uL    MPV 10.6 8.9 - 12.7 fL   Comprehensive metabolic panel    Collection Time: 08/30/23  5:04 AM   Result Value Ref Range    Sodium 139 135 - 147 mmol/L    Potassium 3.7 3.5 - 5.3 mmol/L    Chloride 107 96 - 108 mmol/L    CO2 21 21 - 32 mmol/L    ANION GAP 11 mmol/L    BUN 21 5 - 25 mg/dL    Creatinine 1.18 0.60 - 1.30 mg/dL    Glucose 64 (L) 65 - 140 mg/dL    Calcium 10.2 8.4 - 10.2 mg/dL    Corrected Calcium 11.5 (H) 8.3 - 10.1 mg/dL    AST 48 (H) 13 - 39 U/L    ALT 29 7 - 52 U/L    Alkaline Phosphatase 92 34 - 104 U/L    Total Protein 5.2 (L) 6.4 - 8.4 g/dL    Albumin 2.4 (L) 3.5 - 5.0 g/dL    Total Bilirubin 1.11 (H) 0.20 - 1.00 mg/dL    eGFR 59 ml/min/1.73sq m   CBC    Collection Time: 08/31/23  4:56 AM   Result Value Ref Range    WBC 5.70 4.31 - 10.16 Thousand/uL    RBC 3.62 (L) 3.88 - 5.62 Million/uL    Hemoglobin 11.7 (L) 12.0 - 17.0 g/dL    Hematocrit 36.7 36.5 - 49.3 %     (H) 82 - 98 fL    MCH 32.3 26.8 - 34.3 pg    MCHC 31.9 31.4 - 37.4 g/dL    RDW 15.1 11.6 - 15.1 %    Platelets 945 (L) 844 - 390 Thousands/uL    MPV 10.4 8.9 - 12.7 fL   Comprehensive metabolic panel    Collection Time: 08/31/23  4:56 AM   Result Value Ref Range    Sodium 140 135 - 147 mmol/L    Potassium 3.7 3.5 - 5.3 mmol/L    Chloride 107 96 - 108 mmol/L    CO2 21 21 - 32 mmol/L    ANION GAP 12 mmol/L    BUN 19 5 - 25 mg/dL    Creatinine 1.14 0.60 - 1.30 mg/dL    Glucose 69 65 - 140 mg/dL    Calcium 9.6 8.4 - 10.2 mg/dL    Corrected Calcium 10.9 (H) 8.3 - 10.1 mg/dL    AST 55 (H) 13 - 39 U/L    ALT 31 7 - 52 U/L    Alkaline Phosphatase 86 34 - 104 U/L    Total Protein 5.1 (L) 6.4 - 8.4 g/dL    Albumin 2.4 (L) 3.5 - 5.0 g/dL    Total Bilirubin 1.02 (H) 0.20 - 1.00 mg/dL    eGFR 62 ml/min/1.73sq m       CT chest abdomen pelvis w contrast    Result Date: 8/28/2023  Narrative: CT CHEST, ABDOMEN AND PELVIS WITH IV CONTRAST INDICATION:   abdominal pain and bloating/ dyspnea on exertion. COMPARISON: CT abdomen pelvis October 10, 2016. No prior CT chest available for comparison TECHNIQUE: CT examination of the chest, abdomen and pelvis was performed. Multiplanar 2D reformatted images were created from the source data. This examination, like all CT scans performed in the [facility], was performed utilizing techniques to minimize radiation dose exposure, including the use of iterative reconstruction and automated exposure control. Radiation dose length product (DLP) for this visit:  2374 mGy-cm IV Contrast:  100 mL of iohexol (OMNIPAQUE) Enteric Contrast: Enteric contrast was not administered. FINDINGS: CHEST LUNGS: Mild emphysema. Cluster of masses/nodules in the right upper lobe broadly abuts the pleura and measures up to 4.3 x 3.2 cm (series 6, image 31). PLEURA:  Unremarkable. HEART/GREAT VESSELS: Heart is unremarkable for patient's age.   No thoracic aortic aneurysm. MEDIASTINUM AND DEBRA: Anterior mediastinal nodule measures 1.4 x 1.5 cm (series 2, image 44). CHEST WALL AND LOWER NECK:  Unremarkable. ABDOMEN LIVER/BILIARY TREE: Hepatic cirrhosis. Possibly contour deforming lesion along the posterior aspect of hepatic segment 6 measures 1.9 x 1.6 cm (series 2, image 129). 1.6 cm low-attenuation lesion (Hounsfield units of 10) in the right hepatic lobe is similar to 2016 and is most compatible a cyst (series 2, image 108). Patent hepatic and portal veins. No biliary ductal dilation. GALLBLADDER: Post cholecystectomy SPLEEN:  Unremarkable. PANCREAS:  Unremarkable. ADRENAL GLANDS: Right adrenal lesion measures a 1.1 x 0.9 cm, previously 1.0 x 0.6 cm on October 10, 2016 whereupon it had average Hounsfield units of 3 on the unenhanced portion of the study (series 2, image 112). No left adrenal lesion. KIDNEYS/URETERS: Horseshoe kidney. No hydroureteronephrosis. STOMACH AND BOWEL: Multiple areas of mural thickening and heterogeneous attenuation throughout the stomach. No bowel obstruction. Colonic diverticulosis. APPENDIX:  No findings to suggest appendicitis. ABDOMINOPELVIC CAVITY: Omental mass centered in the left upper quadrant surrounding the greater and lesser curvatures of the stomach measures approximately 8.9 x 5.2 cm laterally where it abuts the spleen and surrounds the arteries (series 2, image 116),  and 9.8 x 4.0 cm anteriorly (series 2, image 125). Multiple similar masses/nodes along the gastrohepatic ligament measure up to 5.1 x 2.0 cm (series 2, image 101). Similar-appearing mesenteric nodule or mass to the right of midline measures 2.4 x 1.8 cm  (series 2, image 132). The left sided nodularity abuts the proximal descending colon (for example series 601, image 101). Moderate volume ascites. VESSELS:  Unremarkable for patient's age. PELVIS REPRODUCTIVE ORGANS:  Unremarkable for patient's age. URINARY BLADDER:  Unremarkable.  ABDOMINAL WALL/INGUINAL REGIONS: New nodule in the right ventral lateral abdominal wall measures 1.6 x 1.6 cm (series 2, image 128). OSSEOUS STRUCTURES: 0.2 cm sclerosis in the lateral right 9th rib is new since October 10, 2016 (series 2, image 107). Multilevel degenerative change of the spine. Impression: 1. 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 2.  Cluster of nodules in the right upper lobe are concerning for pulmonary metastasis. 3.  Ventral abdominal wall nodule is new since 2016 and is concerning for metastasis. 4.  A right adrenal adenoma has slightly increased in size since October 10, 2016 and may represent a metastasis. 5.  Subcentimeter sclerotic focus in the lateral right 9th rib is indeterminate for metastasis. 6.  Hepatic cirrhosis. 7.  Possible hepatic lesion. Contrast-enhanced MRI can be considered for further assessment should it guide clinical management. 8.  Moderate volume ascites can be due to the metastatic disease and/or liver disease. I personally discussed this study with EDE MANRIQUEZ on 8/28/2023 1:40 PM. Workstation performed: [id number]       Labs and pertinent reports reviewed. This note has been generated by voice recognition software system. Therefore, there may be spelling, grammar, and or syntax errors. Please contact if questions arise.

## 2023-09-01 ENCOUNTER — PATIENT OUTREACH (OUTPATIENT)
Dept: HEMATOLOGY ONCOLOGY | Facility: CLINIC | Age: 78
End: 2023-09-01

## 2023-09-01 ENCOUNTER — TELEPHONE (OUTPATIENT)
Dept: HEMATOLOGY ONCOLOGY | Facility: CLINIC | Age: 78
End: 2023-09-01

## 2023-09-01 ENCOUNTER — DOCUMENTATION (OUTPATIENT)
Dept: NUTRITION | Facility: CLINIC | Age: 78
End: 2023-09-01

## 2023-09-01 DIAGNOSIS — K31.89 GASTRIC MASS: Primary | ICD-10-CM

## 2023-09-01 LAB
BACTERIA SPEC BFLD CULT: NO GROWTH
GRAM STN SPEC: NORMAL
GRAM STN SPEC: NORMAL

## 2023-09-01 PROCEDURE — 88112 CYTOPATH CELL ENHANCE TECH: CPT | Performed by: PATHOLOGY

## 2023-09-01 PROCEDURE — 88305 TISSUE EXAM BY PATHOLOGIST: CPT | Performed by: PATHOLOGY

## 2023-09-01 PROCEDURE — 88341 IMHCHEM/IMCYTCHM EA ADD ANTB: CPT | Performed by: PATHOLOGY

## 2023-09-01 PROCEDURE — 88342 IMHCHEM/IMCYTCHM 1ST ANTB: CPT | Performed by: PATHOLOGY

## 2023-09-01 NOTE — PROGRESS NOTES
Phone outreach to the patient, I introduced myself and explained my service to him. I went over his upcoming appointments and the patient is aware of them. The patient reports understanding his diagnosis and was "calm" when speaking with him. Pt denies any pain and his only complaint is reduced appetite and nausea with occasional vomiting after eating. He reports eating just a few bites then feels nauseous. Pt also reports losing about 15 pounds in the past few weeks. I suggested that he get ensure clear (reports trouble drinking milk) and start drinking 3 daily. Patient confirmed understanding. Pt reports living at home with his wife and is a good support for him. He denies any barriers getting to or from any of his appointment and his wife will drive him. I placed referrals for Palliative Care, Social Work, and Nutrition. I explained what each service can offer and the patient agreed. At this point he declined any other resource options. I discussed with him and explained in detail about a possible port placement for Chemotherapy if this is planned for his treatment and he confirmed understanding. We completed a General Assessment and MST together, I provided my contact information and Glo Shabazz and instructed him to please call if he has any questions or concerns. I thanked him for his time.

## 2023-09-01 NOTE — PROGRESS NOTES
Received notification by RN Navigator Blaire Lucas on 9/1/23 that pt is appropriate for oncology nutrition care (reason for referral: Gastric CA dx and Malnutrition Screening Tool (MST) Triggers: scored a 3 indicating 14-23# (6.4-10.5 kg) recent wt loss and is eating poorly due to a decreased appetite. (Date of MST: 9/1/23)). Chart review complete. Patient will be establishing care with oncologists in the coming weeks. Will reach out after these consults and establish care with oncology nutrition services accordingly.

## 2023-09-01 NOTE — TELEPHONE ENCOUNTER
I phoned the patient and introduced myself and indicated that I was calling from [facility] regarding his upcoming appointment and to assist in scheduling his PET CT and port placement. We first reviewed the details of his Hematology/Oncology follow up appointment with Dr. Julián Thao (9/11, [address] [place] [address]), the office address and location, and the [facility] number as the office contact. Following this, we reviewed his preferences for the PET and port appointments. He indicated that his wife will be able to provide transportation, but that she works and he would prefer the appointments to be later in the day to prevent her from having to take too much time off of work. As far as the location of the PET, the patient prefers [facility]. I expressed to the patient that I would call to have these appointments scheduled and then would call him back with the appointment details. I then phoned Central Scheduling and they were able to schedule his PET CT on 9/25 at [facility] at 1330. Next, I phoned IR and they were able to schedule his port placement at [facility] on 9/19 at 1415, with a 1315 arrival time. I phoned Corrie Etienne back and together we reviewed the above appointment details. For the PET CT, I indicated that the staff from PET CT would be contacting him most likely the Friday before the appointment to review the details of the test. I did review with him that for this test, he is to not perform strenuous activity for 36 hours prior to the test and he is only allowed to consume water (no other food or beverage) for 6 hours prior to the test. For the port appointment, I did review with him that the staff from IR would be contacting him a week before the appointment to review the information for the procedure. Corrie Etienne expressed understanding of all of the above information and was appreciative of the appointments and call.

## 2023-09-01 NOTE — PROGRESS NOTES
Scheduled a palliative care consult for 9/7/2023 [facility]. Called pt to confirm details, no answer, left a detailed message with the appt date, time and location. Left my contact information if he has any questions     Pts wife returned my call, confirmed all upcoming appointments, gave her directions and instructions for all the upcoming imaging. She was tearful but understood all the instructions. She had some questions about the draining of his stomach fluid, I told her I would have REJI Donald reach out to her to discuss.   She was thankful for the call, and has my contact information if she has any questions about the scheduled appointments

## 2023-09-01 NOTE — UTILIZATION REVIEW
NOTIFICATION OF ADMISSION DISCHARGE   This is a Notification of Discharge from [address] [facility]. Please be advised that this patient has been discharge from our facility. Below you will find the admission and discharge date and time including the patient’s disposition. UTILIZATION REVIEW CONTACT:  Speedy Mahan  Utilization   Network Utilization Review Department  Phone: [phone] x carefully listen to the prompts. All voicemails are confidential.  Email: [email or web address]     ADMISSION INFORMATION  PRESENTATION DATE: 8/28/2023 10:34 AM  OBERVATION ADMISSION DATE:   INPATIENT ADMISSION DATE: 8/28/23  2:07 PM   DISCHARGE DATE: 8/31/2023  2:14 PM   DISPOSITION:Home/Self Care    IMPORTANT INFORMATION:  Send all requests for admission clinical reviews, approved or denied determinations and any other requests to dedicated fax number below belonging to the campus where the patient is receiving treatment.  List of dedicated fax numbers:  [place] DENIALS (Administrative/Medical Necessity) [phone] [address] [facility] (Maternity/NICU/Pediatrics) [phone]   [facility] [phone]   [facility] [phone] [address] [phone]   [address] [place] [address] [phone]   [facility] [phone]   [address] [place] [address] [address] [facility] [phone]   [address] [facility] [phone] [address] [facility] [phone] [address] [facility] [address] [facility] [phone]

## 2023-09-05 ENCOUNTER — PATIENT OUTREACH (OUTPATIENT)
Dept: HEMATOLOGY ONCOLOGY | Facility: CLINIC | Age: 78
End: 2023-09-05

## 2023-09-05 ENCOUNTER — PATIENT OUTREACH (OUTPATIENT)
Dept: CASE MANAGEMENT | Facility: HOSPITAL | Age: 78
End: 2023-09-05

## 2023-09-05 ENCOUNTER — TELEPHONE (OUTPATIENT)
Dept: HEMATOLOGY ONCOLOGY | Facility: CLINIC | Age: 78
End: 2023-09-05

## 2023-09-05 ENCOUNTER — TELEPHONE (OUTPATIENT)
Dept: GASTROENTEROLOGY | Facility: CLINIC | Age: 78
End: 2023-09-05

## 2023-09-05 NOTE — TELEPHONE ENCOUNTER
Patient's wife does not want to schedule an appointment with hepatology. She is going to schedule with another practice.

## 2023-09-05 NOTE — TELEPHONE ENCOUNTER
----- Message from Stephan Sharma sent at 9/5/2023  2:20 PM EDT -----  Regarding: FW: Follow-up    ----- Message -----  From: Bear Salazar DO  Sent: 8/31/2023  12:05 PM EDT  To: Mitzy Oseguera MD; #  Subject: Follow-up                                        Please see if schedule follow-up for patient in hepatology in the next 1 to 2 months to discuss management of ascites and new diagnosis of cirrhosis.

## 2023-09-05 NOTE — PROGRESS NOTES
Phone outreach to the patient, I spoke with Anthony Nearing his wife, she informed me that they will be going to a different oncologist for his care and they had canceled everything already. I wished them luck in the treatment and if they have any questions or concerns they care reach out to me for anything.

## 2023-09-05 NOTE — TELEPHONE ENCOUNTER
Appointment Change  Cancel, Reschedule, Change to Virtual      Who are you speaking with? Patient   If it is not the patient, is the caller listed on the communication consent form? Yes   Which provider is the appointment scheduled with? Dr. Donavon Ramírez   When was the original appointment scheduled? Please list date and time 9/11/23   At which location is the appointment scheduled to take place? [facility]   Was the appointment rescheduled? Was the appointment changed from an in person visit to a virtual visit? If so, please list the details of the change. No   What is the reason for the appointment change? Patient has his own oncologist and wants to cancel everything with [facility]. Needed to call  To cancel appt also.  Provided the number    IR PORT PLACEMENT SED   Dept directions for [address] [place] [address] IR:  [facility], [address] [place] [address] [phone]

## 2023-09-05 NOTE — TELEPHONE ENCOUNTER
Phoned pt and spoke to his wife. Wife cancelled all appts with [facility] as pt does have an oncologist that he has been seeing for years and  is at [facility] and pt already scheduled for bone marrow bx, port placement and a Pet Scan. I did tell wife that if pt becomes symptomatic or has acute pain or SOB to report to ED. Wife appreciated call and everything that [facility] has done.

## 2023-09-05 NOTE — PROGRESS NOTES
OSW received referral for patient. Patient has appointment with Dr. Francisco J Black on 9/11/23 for follow up from recent hospitalization. Patient has consult with Dr. Daxa Hale on 9/7/23. OSW will follow for plan/dx.

## 2023-09-05 NOTE — TELEPHONE ENCOUNTER
Mehul Justice calling in stating she wants all of the patient's appointment with us. Refugio Chavarria know that there were no future appointments scheduled. Mehul Justice voiced understanding.

## 2023-09-11 ENCOUNTER — TELEPHONE (OUTPATIENT)
Dept: HEMATOLOGY ONCOLOGY | Facility: CLINIC | Age: 78
End: 2023-09-11

## 2023-09-11 PROCEDURE — 88365 INSITU HYBRIDIZATION (FISH): CPT | Performed by: PATHOLOGY

## 2023-09-11 PROCEDURE — 88341 IMHCHEM/IMCYTCHM EA ADD ANTB: CPT | Performed by: PATHOLOGY

## 2023-09-11 PROCEDURE — 88307 TISSUE EXAM BY PATHOLOGIST: CPT | Performed by: PATHOLOGY

## 2023-09-11 PROCEDURE — 88305 TISSUE EXAM BY PATHOLOGIST: CPT | Performed by: PATHOLOGY

## 2023-09-11 PROCEDURE — 88313 SPECIAL STAINS GROUP 2: CPT | Performed by: PATHOLOGY

## 2023-09-11 PROCEDURE — 88368 INSITU HYBRIDIZATION MANUAL: CPT | Performed by: PATHOLOGY

## 2023-09-11 PROCEDURE — 88342 IMHCHEM/IMCYTCHM 1ST ANTB: CPT | Performed by: PATHOLOGY

## 2023-09-11 NOTE — TELEPHONE ENCOUNTER
Left a voice message on pt's home phone and wife's cell number for a return call so I can forward the most recent addended path report to pt's oncologist. I left my Teams number [phone] for a return call.

## 2023-09-12 ENCOUNTER — PATIENT OUTREACH (OUTPATIENT)
Dept: CASE MANAGEMENT | Facility: HOSPITAL | Age: 78
End: 2023-09-12

## 2023-09-12 ENCOUNTER — TELEPHONE (OUTPATIENT)
Dept: HEMATOLOGY ONCOLOGY | Facility: CLINIC | Age: 78
End: 2023-09-12

## 2023-09-12 NOTE — TELEPHONE ENCOUNTER
Discussed Advanced Care Directives. No information on file. Information given for patient to review. Wife returned a call to me and provided me with the name of his Oncologist Dr Jasmeet Macario from [address] [place] [address] in [place]. I phoned his office and pt does have an appt tomorrow. I faxed over the addended path report so it is available for his appt. Phoned pt on his mobile phone and left a message asking for a return call to pt reagarding his addended path report that needs to be sent to his oncologist. Left my teams number [phone].

## 2023-09-12 NOTE — PROGRESS NOTES
OSW completed chart review. Patient has cancelled all appointments with [facility], patient will be pursuing care through [facility]. LSW will close.